# Patient Record
Sex: MALE | Race: BLACK OR AFRICAN AMERICAN | NOT HISPANIC OR LATINO | Employment: FULL TIME | URBAN - METROPOLITAN AREA
[De-identification: names, ages, dates, MRNs, and addresses within clinical notes are randomized per-mention and may not be internally consistent; named-entity substitution may affect disease eponyms.]

---

## 2018-05-07 ENCOUNTER — HOSPITAL ENCOUNTER (EMERGENCY)
Facility: HOSPITAL | Age: 23
Discharge: HOME | End: 2018-05-07
Attending: EMERGENCY MEDICINE
Payer: MEDICAID

## 2018-05-07 VITALS
DIASTOLIC BLOOD PRESSURE: 66 MMHG | HEIGHT: 63 IN | BODY MASS INDEX: 26.58 KG/M2 | TEMPERATURE: 98 F | RESPIRATION RATE: 16 BRPM | SYSTOLIC BLOOD PRESSURE: 121 MMHG | WEIGHT: 150 LBS | OXYGEN SATURATION: 96 % | HEART RATE: 68 BPM

## 2018-05-07 DIAGNOSIS — Z87.09 HISTORY OF ASTHMA: ICD-10-CM

## 2018-05-07 DIAGNOSIS — R06.02 SOB (SHORTNESS OF BREATH): Primary | ICD-10-CM

## 2018-05-07 PROCEDURE — 93005 ELECTROCARDIOGRAM TRACING: CPT

## 2018-05-07 PROCEDURE — 93010 ELECTROCARDIOGRAM REPORT: CPT | Mod: ,,, | Performed by: INTERNAL MEDICINE

## 2018-05-07 PROCEDURE — 99284 EMERGENCY DEPT VISIT MOD MDM: CPT | Mod: 25

## 2018-05-07 PROCEDURE — 25000242 PHARM REV CODE 250 ALT 637 W/ HCPCS: Performed by: NURSE PRACTITIONER

## 2018-05-07 PROCEDURE — 94640 AIRWAY INHALATION TREATMENT: CPT | Mod: 76

## 2018-05-07 PROCEDURE — 94761 N-INVAS EAR/PLS OXIMETRY MLT: CPT

## 2018-05-07 RX ORDER — ALBUTEROL SULFATE 90 UG/1
1-2 AEROSOL, METERED RESPIRATORY (INHALATION) EVERY 6 HOURS PRN
Qty: 1 INHALER | Refills: 0 | Status: ON HOLD | OUTPATIENT
Start: 2018-05-07 | End: 2020-01-22 | Stop reason: SDUPTHER

## 2018-05-07 RX ORDER — IPRATROPIUM BROMIDE AND ALBUTEROL SULFATE 2.5; .5 MG/3ML; MG/3ML
3 SOLUTION RESPIRATORY (INHALATION)
Status: COMPLETED | OUTPATIENT
Start: 2018-05-07 | End: 2018-05-07

## 2018-05-07 RX ORDER — ALBUTEROL SULFATE 0.83 MG/ML
2.5 SOLUTION RESPIRATORY (INHALATION) EVERY 6 HOURS PRN
Qty: 1 BOX | Refills: 0 | Status: SHIPPED | OUTPATIENT
Start: 2018-05-07 | End: 2019-05-07

## 2018-05-07 RX ADMIN — IPRATROPIUM BROMIDE AND ALBUTEROL SULFATE 3 ML: .5; 2.5 SOLUTION RESPIRATORY (INHALATION) at 09:05

## 2018-05-07 NOTE — ED PROVIDER NOTES
Encounter Date: 5/7/2018  SORT: This is a 21 y/o male that comes to the ER c/o SOB. Initial exam notable for clear breath sounds bilaterally, though pt appears to be tight and labored. O2 saturation is 100% on room air.         History     Chief Complaint   Patient presents with    Shortness of Breath     SOB with chest tightness while jogging.  PMx asthma.  O2 sats 100%, labored breathing.     22-year-old male with history of asthma that requires daily steroid presents emergency department for gradual onset of chest pain associated shortness of breath while jogging at approximately 8:30 a.m. this morning.  Patient states shortness of breath was severe enough to bring him to his knees and caused him to vomit once.  Patient states symptoms are similar to his past asthma exacerbations.  Never hospitalized or intubated for his asthma.  Currently asymptomatic after breathing treatment in triage.  No other medications taken prior to arrival.  Acting normal per family.          Review of patient's allergies indicates:   Allergen Reactions    Peanut Anaphylaxis     Past Medical History:   Diagnosis Date    Asthma      History reviewed. No pertinent surgical history.  Family History   Problem Relation Age of Onset    Kidney disease Father     Hypertension Father      Social History   Substance Use Topics    Smoking status: Never Smoker    Smokeless tobacco: Never Used    Alcohol use No     Review of Systems   Constitutional: Negative for fever.   HENT: Negative for sore throat.    Respiratory: Positive for shortness of breath (resolved). Negative for cough.    Cardiovascular: Positive for chest pain (resolved).   Gastrointestinal: Positive for vomiting (x1; resolved). Negative for abdominal pain and nausea.   Genitourinary: Negative for dysuria.   Musculoskeletal: Negative for back pain and neck pain.   Neurological: Negative for headaches.   All other systems reviewed and are negative.      Physical Exam      Initial Vitals [05/07/18 0925]   BP Pulse Resp Temp SpO2   120/64 105 (!) 24 98.5 °F (36.9 °C) 100 %      MAP       82.67         Physical Exam    Nursing note and vitals reviewed.  Constitutional: He appears well-developed and well-nourished. He is not diaphoretic. No distress.   HENT:   Head: Normocephalic and atraumatic.   Nose: Nose normal.   Mouth/Throat: Oropharynx is clear and moist. No oropharyngeal exudate.   Eyes: Conjunctivae and EOM are normal. Right eye exhibits no discharge. Left eye exhibits no discharge.   Neck: Normal range of motion. No tracheal deviation present. No JVD present.   Cardiovascular: Normal rate, regular rhythm and normal heart sounds. Exam reveals no friction rub.    No murmur heard.  Pulmonary/Chest: Breath sounds normal. No accessory muscle usage or stridor. No tachypnea. No respiratory distress. He has no decreased breath sounds. He has no wheezes. He has no rhonchi. He has no rales. He exhibits no tenderness.   Abdominal: Soft. He exhibits no distension. There is no tenderness. There is no rigidity, no rebound, no guarding, no CVA tenderness, no tenderness at McBurney's point and negative Houser's sign.   Musculoskeletal: Normal range of motion.   Neurological: He is alert and oriented to person, place, and time.   Skin: Skin is warm and dry. No rash and no abscess noted. No erythema. No pallor.         ED Course   Procedures  Labs Reviewed - No data to display  EKG Readings: (Independently Interpreted)   Initial Reading: No STEMI. Rhythm: Sinus Tachycardia. Heart Rate: 110. Axis: Normal.       X-Rays:   Independently Interpreted Readings:   Chest X-Ray: No acute abnormalities.     Medical Decision Making:   History:   Old Medical Records: I decided to obtain old medical records.    This is an emergent evaluation of a 22 y.o. male with a PMHx of asthma presenting to the ED for SOB described as similar to his past asthma flares per patient. Denies fever, trauma,and hemoptysis.  , vitals otherwise WNL, afebrile; SpO2 100%.     Duoneb ordered from triage significant improves symptoms per patient; currently asymptomatic. Normal vitals. Normal behavior per family.     EKG shows no arhythmia. CXR shows no PNA, atelectasis, pneumomediastinum, or PTX.    Presentation consistent with acute exercise induced asthma exacerbation in this patient with known Hx of asthma. Given the above, I have also considered but doubt airway foreign body, anaphylaxis, angioedema, epiglottitis, Rk's, PTA, retropharyngeal abscess, strep throat, and PE. I doubt cardiac etiology.    Discharged home with Albuterol inhaler, nebulizer solution. Instructed to follow up with PCP promptly for reevaluation and management of symptoms. Issued educational handout on ways to avoid asthma triggers.     I discussed with the patient the diagnosis, treatment plan, indications for return to the emergency department, and for expected follow-up. The patient verbalized an understanding. The patient is asked if there are any questions or concerns. We discuss the case, until all issues are addressed to the patients satisfaction. Patient understands and is agreeable to the plan.     I discussed this patient with Dr. Post who is in agreement with my assessment and plan.                     Clinical Impression:   The primary encounter diagnosis was SOB (shortness of breath). A diagnosis of History of asthma was also pertinent to this visit.    Disposition:   Disposition: Discharged  Condition: Stable                        Go Oro PA-C  05/07/18 7497

## 2018-05-07 NOTE — ED TRIAGE NOTES
Mom reports son was jogging this AM. C/o CP, SOB, chest tightness, cough,  Vomiting with forceful cough this AM.

## 2018-08-28 ENCOUNTER — HOSPITAL ENCOUNTER (EMERGENCY)
Facility: OTHER | Age: 23
Discharge: HOME | End: 2018-08-28
Attending: EMERGENCY MEDICINE
Payer: MEDICAID

## 2018-08-28 VITALS
TEMPERATURE: 99 F | OXYGEN SATURATION: 100 % | RESPIRATION RATE: 16 BRPM | HEIGHT: 64 IN | WEIGHT: 147 LBS | BODY MASS INDEX: 25.1 KG/M2 | HEART RATE: 109 BPM | DIASTOLIC BLOOD PRESSURE: 69 MMHG | SYSTOLIC BLOOD PRESSURE: 130 MMHG

## 2018-08-28 DIAGNOSIS — J45.901 EXACERBATION OF ASTHMA, UNSPECIFIED ASTHMA SEVERITY, UNSPECIFIED WHETHER PERSISTENT: Primary | ICD-10-CM

## 2018-08-28 PROCEDURE — 25000242 PHARM REV CODE 250 ALT 637 W/ HCPCS: Performed by: EMERGENCY MEDICINE

## 2018-08-28 PROCEDURE — 94640 AIRWAY INHALATION TREATMENT: CPT

## 2018-08-28 PROCEDURE — 63600175 PHARM REV CODE 636 W HCPCS: Performed by: EMERGENCY MEDICINE

## 2018-08-28 PROCEDURE — 94761 N-INVAS EAR/PLS OXIMETRY MLT: CPT

## 2018-08-28 PROCEDURE — 99284 EMERGENCY DEPT VISIT MOD MDM: CPT | Mod: 25

## 2018-08-28 RX ORDER — IPRATROPIUM BROMIDE AND ALBUTEROL SULFATE 2.5; .5 MG/3ML; MG/3ML
3 SOLUTION RESPIRATORY (INHALATION)
Status: COMPLETED | OUTPATIENT
Start: 2018-08-28 | End: 2018-08-28

## 2018-08-28 RX ORDER — PREDNISONE 20 MG/1
40 TABLET ORAL DAILY
Qty: 10 TABLET | Refills: 0 | Status: SHIPPED | OUTPATIENT
Start: 2018-08-28 | End: 2018-09-02

## 2018-08-28 RX ORDER — PREDNISONE 20 MG/1
60 TABLET ORAL
Status: COMPLETED | OUTPATIENT
Start: 2018-08-28 | End: 2018-08-28

## 2018-08-28 RX ORDER — ALBUTEROL SULFATE 0.83 MG/ML
2.5 SOLUTION RESPIRATORY (INHALATION)
Status: ACTIVE | OUTPATIENT
Start: 2018-08-28 | End: 2018-08-28

## 2018-08-28 RX ADMIN — PREDNISONE 60 MG: 20 TABLET ORAL at 02:08

## 2018-08-28 RX ADMIN — IPRATROPIUM BROMIDE AND ALBUTEROL SULFATE 3 ML: .5; 3 SOLUTION RESPIRATORY (INHALATION) at 02:08

## 2018-08-28 NOTE — ED NOTES
Pt to er with c/o sob  X 30 min . Pt given duoneb in route. respiration still labored ,air movement good all lobes.  Skin warm and dry. aaox3 no peripheral edema noted. Talking in full  Short sentences but deep breath each time.

## 2018-08-28 NOTE — PROVIDER PROGRESS NOTES - EMERGENCY DEPT.
Encounter Date: 8/28/2018    ED Physician Progress Notes        Physician Note:   5:47 PM patient called ED to inform us he forgot his prescription for prednisone at the ER. He is requesting that we call in the rx to his pharmacy. [facility] on [address]. I was able to call this prescription in for the patient. Prednisone 40mg daily for 5 days with no refills. EDMD aware. NOELLE BLACKBURN

## 2018-08-28 NOTE — ED NOTES
Pt has duplicate orders for nebulized tx. Duo nebs given and tolerated well. Albuterol not given,asked RN to discontinue.

## 2018-08-28 NOTE — ED PROVIDER NOTES
, but did not have his asthma pump with him at school.  Encounter Date: 8/28/2018    SCRIBE #1 NOTE: I, Raz Rosales, am scribing for, and in the presence of, Dr. Jackson.       History     Chief Complaint   Patient presents with    Shortness of Breath     pt with sob x 30 min . pt given duoneb in route     Time seen by provider: 2:29 PM    This is a 22 y.o. male with a history of asthma and eczema who presents with complaint of shortness of breath that began approximately one hour ago. He reports that he is also experiencing a productive cough with yellow clear mucous. EMS reports that he was given a nebulizer treatment before arrival. The patient reports that he take Breo once a day and has a ProAir. He reports that his morning he took his inhaler twice before starting his day. When the asthma attack started he reports that he didn't use his inhaler, due to him not having it. The last time the patient remembers being put on prednisone was a few months ago. He denies fever, sore throat, chest pain, nausea, and dysuria.       The history is provided by the patient and the EMS personnel.     Review of patient's allergies indicates:   Allergen Reactions    Peanut Anaphylaxis     Past Medical History:   Diagnosis Date    Asthma     Eczema      History reviewed. No pertinent surgical history.  Family History   Problem Relation Age of Onset    Kidney disease Father     Hypertension Father      Social History     Tobacco Use    Smoking status: Never Smoker    Smokeless tobacco: Never Used   Substance Use Topics    Alcohol use: No    Drug use: No     Review of Systems   Constitutional: Negative for fever.   HENT: Negative for sore throat.    Respiratory: Positive for cough (Productive) and shortness of breath.    Cardiovascular: Negative for chest pain.   Gastrointestinal: Negative for nausea.   Genitourinary: Negative for dysuria.   Musculoskeletal: Negative for back pain.   Skin: Negative for rash.    Neurological: Negative for weakness.   Hematological: Does not bruise/bleed easily.       Physical Exam     Initial Vitals [08/28/18 1425]   BP Pulse Resp Temp SpO2   135/73 (!) 135 20 98.1 °F (36.7 °C) 100 %      MAP       --         Physical Exam    Nursing note and vitals reviewed.  Constitutional: He appears well-developed and well-nourished. He is not diaphoretic. No distress.   Thin male. Mild dyspnea with exertion or talking.    HENT:   Head: Normocephalic and atraumatic.   Right Ear: External ear normal.   Left Ear: External ear normal.   Mucous membranes are moist.   Eyes: EOM are normal. Pupils are equal, round, and reactive to light. Right eye exhibits no discharge. Left eye exhibits no discharge.   No pallor or icterus.   Neck: Normal range of motion.   Cardiovascular: Normal rate, regular rhythm and normal heart sounds. Exam reveals no gallop and no friction rub.    No murmur heard.  Pulmonary/Chest: No respiratory distress. He has wheezes. He has no rhonchi. He has no rales.   Faint expiratory wheeze on right. Good air exchange. No accessory muscle use.   Abdominal: Soft. There is no tenderness. There is no rebound and no guarding.   Musculoskeletal: Normal range of motion. He exhibits no edema or tenderness.   Neurological: He is alert and oriented to person, place, and time.   Skin: Skin is warm and dry. No rash and no abscess noted. No erythema. No pallor.   Psychiatric: He has a normal mood and affect. His behavior is normal. Judgment and thought content normal.         ED Course   Procedures  Labs Reviewed - No data to display       Imaging Results    None          Medical Decision Making:   ED Management:  3:49 PM  The patient is feeling better. He reports that her is back to baseline and requesting discharge.            Scribe Attestation:   Scribe #1: I performed the above scribed service and the documentation accurately describes the services I performed. I attest to the accuracy of the  note.    Attending Attestation:           Physician Attestation for Scribe:  Physician Attestation Statement for Scribe #1: I, Dr. Jackson, reviewed documentation, as scribed by Raz Rosales in my presence, and it is both accurate and complete.           Patient presents with shortness of breath, wheezing.  History of asthma , but did not have his albuterol pump with him when the symptoms started.  Given nebulizer treatment by EMS and is improved but still feels tight upon arrival here.  No recent fevers.  On exam he is well-appearing with only faint expiratory wheeze, good air exchange.  Given further nebulizer treatments and now feels back to baseline.  Repeat lung exam at this point is clear to auscultation.  Will be started on a burst of prednisone.  Mom now also at bedside and also updated on findings and plan of care             Clinical Impression:     1. Exacerbation of asthma, unspecified asthma severity, unspecified whether persistent                                 Renan Jackson II, MD  08/30/18 0883

## 2018-08-28 NOTE — ED NOTES
"Assumed care of pt, received BSSR from YVAN Soria. Pt semi fowlers in bed, AAOx4, GCS 15, calm, cooperative, responding appropriately to questions, speaking in full sentences, skin warm and dry, respirations even and unlabored, lungs CTA bilaterally, pt states "I feel a lot better". Visitor at bedside. Bed locked and in lowest position, side rails x 2, call light in reach.  "

## 2019-10-03 ENCOUNTER — HOSPITAL ENCOUNTER (EMERGENCY)
Facility: HOSPITAL | Age: 24
Discharge: HOME | End: 2019-10-03
Attending: EMERGENCY MEDICINE
Payer: MEDICAID

## 2019-10-03 VITALS
HEART RATE: 95 BPM | DIASTOLIC BLOOD PRESSURE: 79 MMHG | BODY MASS INDEX: 28.77 KG/M2 | SYSTOLIC BLOOD PRESSURE: 121 MMHG | WEIGHT: 179 LBS | HEIGHT: 66 IN | OXYGEN SATURATION: 99 % | RESPIRATION RATE: 18 BRPM | TEMPERATURE: 98 F

## 2019-10-03 DIAGNOSIS — F25.9 SCHIZOAFFECTIVE SCHIZOPHRENIA: Primary | ICD-10-CM

## 2019-10-03 LAB
ALBUMIN SERPL BCP-MCNC: 4.5 G/DL (ref 3.5–5.2)
ALP SERPL-CCNC: 90 U/L (ref 55–135)
ALT SERPL W/O P-5'-P-CCNC: 28 U/L (ref 10–44)
AMPHET+METHAMPHET UR QL: NEGATIVE
ANION GAP SERPL CALC-SCNC: 7 MMOL/L (ref 8–16)
APAP SERPL-MCNC: <3 UG/ML (ref 10–20)
AST SERPL-CCNC: 29 U/L (ref 10–40)
BARBITURATES UR QL SCN>200 NG/ML: NEGATIVE
BASOPHILS # BLD AUTO: 0.05 K/UL (ref 0–0.2)
BASOPHILS NFR BLD: 0.6 % (ref 0–1.9)
BENZODIAZ UR QL SCN>200 NG/ML: NEGATIVE
BILIRUB SERPL-MCNC: 0.6 MG/DL (ref 0.1–1)
BILIRUB UR QL STRIP: NEGATIVE
BUN SERPL-MCNC: 9 MG/DL (ref 6–20)
BZE UR QL SCN: NEGATIVE
CALCIUM SERPL-MCNC: 9.6 MG/DL (ref 8.7–10.5)
CANNABINOIDS UR QL SCN: NORMAL
CHLORIDE SERPL-SCNC: 103 MMOL/L (ref 95–110)
CLARITY UR: ABNORMAL
CO2 SERPL-SCNC: 28 MMOL/L (ref 23–29)
COLOR UR: YELLOW
CREAT SERPL-MCNC: 1.2 MG/DL (ref 0.5–1.4)
CREAT UR-MCNC: 265.6 MG/DL (ref 23–375)
DIFFERENTIAL METHOD: ABNORMAL
EOSINOPHIL # BLD AUTO: 0.6 K/UL (ref 0–0.5)
EOSINOPHIL NFR BLD: 7.9 % (ref 0–8)
ERYTHROCYTE [DISTWIDTH] IN BLOOD BY AUTOMATED COUNT: 13.6 % (ref 11.5–14.5)
EST. GFR  (AFRICAN AMERICAN): >60 ML/MIN/1.73 M^2
EST. GFR  (NON AFRICAN AMERICAN): >60 ML/MIN/1.73 M^2
ETHANOL SERPL-MCNC: <10 MG/DL
GLUCOSE SERPL-MCNC: 91 MG/DL (ref 70–110)
GLUCOSE UR QL STRIP: NEGATIVE
HCT VFR BLD AUTO: 43 % (ref 40–54)
HGB BLD-MCNC: 13.8 G/DL (ref 14–18)
HGB UR QL STRIP: NEGATIVE
KETONES UR QL STRIP: NEGATIVE
LEUKOCYTE ESTERASE UR QL STRIP: NEGATIVE
LYMPHOCYTES # BLD AUTO: 2.5 K/UL (ref 1–4.8)
LYMPHOCYTES NFR BLD: 31.4 % (ref 18–48)
MCH RBC QN AUTO: 29.2 PG (ref 27–31)
MCHC RBC AUTO-ENTMCNC: 32.1 G/DL (ref 32–36)
MCV RBC AUTO: 91 FL (ref 82–98)
METHADONE UR QL SCN>300 NG/ML: NEGATIVE
MONOCYTES # BLD AUTO: 0.8 K/UL (ref 0.3–1)
MONOCYTES NFR BLD: 10.6 % (ref 4–15)
NEUTROPHILS # BLD AUTO: 3.9 K/UL (ref 1.8–7.7)
NEUTROPHILS NFR BLD: 49.5 % (ref 38–73)
NITRITE UR QL STRIP: NEGATIVE
OPIATES UR QL SCN: NEGATIVE
PCP UR QL SCN>25 NG/ML: NEGATIVE
PH UR STRIP: >8 [PH] (ref 5–8)
PLATELET # BLD AUTO: 302 K/UL (ref 150–350)
PMV BLD AUTO: 10.2 FL (ref 9.2–12.9)
POTASSIUM SERPL-SCNC: 3.8 MMOL/L (ref 3.5–5.1)
PROT SERPL-MCNC: 7.5 G/DL (ref 6–8.4)
PROT UR QL STRIP: NEGATIVE
RBC # BLD AUTO: 4.73 M/UL (ref 4.6–6.2)
SODIUM SERPL-SCNC: 138 MMOL/L (ref 136–145)
SP GR UR STRIP: 1.02 (ref 1–1.03)
TOXICOLOGY INFORMATION: NORMAL
TSH SERPL DL<=0.005 MIU/L-ACNC: 1.4 UIU/ML (ref 0.4–4)
URN SPEC COLLECT METH UR: ABNORMAL
UROBILINOGEN UR STRIP-ACNC: NEGATIVE EU/DL
WBC # BLD AUTO: 7.84 K/UL (ref 3.9–12.7)

## 2019-10-03 PROCEDURE — 80307 DRUG TEST PRSMV CHEM ANLYZR: CPT

## 2019-10-03 PROCEDURE — 85025 COMPLETE CBC W/AUTO DIFF WBC: CPT

## 2019-10-03 PROCEDURE — 80320 DRUG SCREEN QUANTALCOHOLS: CPT

## 2019-10-03 PROCEDURE — 80329 ANALGESICS NON-OPIOID 1 OR 2: CPT

## 2019-10-03 PROCEDURE — 81003 URINALYSIS AUTO W/O SCOPE: CPT

## 2019-10-03 PROCEDURE — 80053 COMPREHEN METABOLIC PANEL: CPT

## 2019-10-03 PROCEDURE — 99285 EMERGENCY DEPT VISIT HI MDM: CPT

## 2019-10-03 PROCEDURE — 84443 ASSAY THYROID STIM HORMONE: CPT

## 2019-10-03 NOTE — DISCHARGE INSTRUCTIONS
Please take her psychiatric medicines exactly as directed.  Please follow-up at the [facility] this week.  Return immediately if he gets worse or if new problems develop.

## 2019-10-03 NOTE — ED TRIAGE NOTES
Patient is schizophrenic and has not been taking his meds  States he went to his new psychiatrist office for his monthly injection but has not seen his new counselor yet  Patient reports general ideas of harming himself, insomnia with visual and auditory hallucinations  Patient requests to go to [facility] when placed

## 2019-10-03 NOTE — ED PROVIDER NOTES
"Encounter Date: 10/3/2019    SCRIBE #1 NOTE: I, Go Rodriguez, am scribing for, and in the presence of,  Carl Post MD. I have scribed the following portions of the note - Other sections scribed: HPI, ROS.       History     Chief Complaint   Patient presents with    Suicidal     SI for the last week states "I would cut myself" hx of depression denies any recent changes to medication.      CC: Suicidal    HPI: This 23 y.o. Male with schizoaffective disorder, schizophrenia bipolar type, asthma and eczema presents to the ED for an evaluation of suidical ideation for the past week. Pt admits he attempted to cut himself. He is hearing voices and seeing things that are not there. Pt has been off his psych medications for months which include a mood stabilizer. He normally complies with Invega shot once a month. Pt reports having a conversation with Ms. Ellis from [facility] and he needs to submit a form to go to [facility]. He wants to be admitted to [facility]. Pt denies fever, rhinorrhea, cough, weakness, numbness or any pain.    The history is provided by the patient. No  was used.     Review of patient's allergies indicates:   Allergen Reactions    Peanut Anaphylaxis     Past Medical History:   Diagnosis Date    Asthma     Eczema     Schizo affective schizophrenia      History reviewed. No pertinent surgical history.  Family History   Problem Relation Age of Onset    Kidney disease Father     Hypertension Father      Social History     Tobacco Use    Smoking status: Never Smoker    Smokeless tobacco: Never Used   Substance Use Topics    Alcohol use: No    Drug use: No     Review of Systems   Constitutional: Negative for chills and fever.   HENT: Negative for congestion, rhinorrhea and sore throat.    Eyes: Negative for pain.   Respiratory: Negative for shortness of breath.    Cardiovascular: Negative for chest pain.   Gastrointestinal: Negative for abdominal pain, diarrhea, " nausea and vomiting.   Genitourinary: Negative for dysuria and hematuria.   Musculoskeletal: Negative for back pain and neck pain.   Skin: Negative for rash.   Neurological: Negative for dizziness, syncope and light-headedness.   Psychiatric/Behavioral: Positive for suicidal ideas. The patient is not nervous/anxious.        Physical Exam     Initial Vitals [10/03/19 1250]   BP Pulse Resp Temp SpO2   121/79 95 18 98.1 °F (36.7 °C) 99 %      MAP       --         Physical Exam  The patient was examined specifically for the following:   General:No significant distress, Good color, Warm and dry. Head and neck:Scalp atraumatic, Neck supple. Neurological:Appropriate conversation, Gross motor deficits. Eyes:Conjugate gaze, Clear corneas. ENT: No epistaxis. Cardiac: Regular rate and rhythm, Grossly normal heart tones. Pulmonary: Wheezing, Rales. Gastrointestinal: Abdominal tenderness, Abdominal distention. Musculoskeletal: Extremity deformity, Apparent pain with range of motion of the joints. Skin: Rash.   The findings on examination were normal except for the following:  The patient reports that he is hearing voices, seeing things, and thinking about hurting himself.  ED Course   Procedures  Labs Reviewed   CBC W/ AUTO DIFFERENTIAL - Abnormal; Notable for the following components:       Result Value    Hemoglobin 13.8 (*)     Eos # 0.6 (*)     All other components within normal limits   COMPREHENSIVE METABOLIC PANEL - Abnormal; Notable for the following components:    Anion Gap 7 (*)     All other components within normal limits   URINALYSIS, REFLEX TO URINE CULTURE - Abnormal; Notable for the following components:    Appearance, UA Hazy (*)     pH, UA >8.0 (*)     All other components within normal limits    Narrative:     Preferred Collection Type->Urine, Clean Catch   ACETAMINOPHEN LEVEL - Abnormal; Notable for the following components:    Acetaminophen (Tylenol), Serum <3.0 (*)     All other components within normal  limits   TSH   DRUG SCREEN PANEL, URINE EMERGENCY    Narrative:     Preferred Collection Type->Urine, Clean Catch   ALCOHOL,MEDICAL (ETHANOL)          Imaging Results    None       Medical decision making:  Given the above, PEC form was completed.  I will place this patient Psychiatry.  He is medically clear for psychiatric admission.  The patient is suicidal and hearing voices.  He claims noncompliance with his medicines.    This patient's mother arrived to the emergency room to report that she was not aware that he was not taking his medicines.  She will be sure that he does take his medicines.  The patient reported that he is not suicidal at this time.  And he is not hearing voices.  The patient seems very calm and relaxed.  The mother and the patient heard comfortable with discharge. I will execute that disposition.                Scribe Attestation:   Scribe #1: I performed the above scribed service and the documentation accurately describes the services I performed. I attest to the accuracy of the note.      I personally performed the services described in this documentation.  All medical record  entries made by the scribe are at my direction and in my presence.  Signed, Dr. Post        Clinical Impression:       ICD-10-CM ICD-9-CM   1. Schizoaffective schizophrenia F25.0 295.70                                Carl Post MD  10/03/19 1538       Carl Post MD  10/03/19 6872

## 2020-01-15 ENCOUNTER — HOSPITAL ENCOUNTER (EMERGENCY)
Facility: HOSPITAL | Age: 25
Discharge: PSYCHIATRIC OR SUBSTANCE USE FACILITY | End: 2020-01-15
Attending: EMERGENCY MEDICINE
Payer: MEDICAID

## 2020-01-15 VITALS
HEIGHT: 63 IN | SYSTOLIC BLOOD PRESSURE: 119 MMHG | RESPIRATION RATE: 16 BRPM | DIASTOLIC BLOOD PRESSURE: 57 MMHG | OXYGEN SATURATION: 99 % | BODY MASS INDEX: 31.89 KG/M2 | HEART RATE: 88 BPM | WEIGHT: 180 LBS | TEMPERATURE: 98 F

## 2020-01-15 DIAGNOSIS — R45.851 SUICIDAL IDEATION: Primary | ICD-10-CM

## 2020-01-15 PROBLEM — F25.0 SCHIZOAFFECTIVE DISORDER, BIPOLAR TYPE: Status: ACTIVE | Noted: 2020-01-15

## 2020-01-15 LAB
ALBUMIN SERPL BCP-MCNC: 4.5 G/DL (ref 3.5–5.2)
ALP SERPL-CCNC: 96 U/L (ref 55–135)
ALT SERPL W/O P-5'-P-CCNC: 29 U/L (ref 10–44)
AMPHET+METHAMPHET UR QL: NEGATIVE
ANION GAP SERPL CALC-SCNC: 8 MMOL/L (ref 8–16)
APAP SERPL-MCNC: <3 UG/ML (ref 10–20)
AST SERPL-CCNC: 33 U/L (ref 10–40)
BARBITURATES UR QL SCN>200 NG/ML: NEGATIVE
BASOPHILS # BLD AUTO: 0.05 K/UL (ref 0–0.2)
BASOPHILS NFR BLD: 0.6 % (ref 0–1.9)
BENZODIAZ UR QL SCN>200 NG/ML: NEGATIVE
BILIRUB SERPL-MCNC: 0.4 MG/DL (ref 0.1–1)
BILIRUB UR QL STRIP: NEGATIVE
BUN SERPL-MCNC: 12 MG/DL (ref 6–20)
BZE UR QL SCN: NEGATIVE
CALCIUM SERPL-MCNC: 9.7 MG/DL (ref 8.7–10.5)
CANNABINOIDS UR QL SCN: NORMAL
CHLORIDE SERPL-SCNC: 106 MMOL/L (ref 95–110)
CLARITY UR: CLEAR
CO2 SERPL-SCNC: 24 MMOL/L (ref 23–29)
COLOR UR: YELLOW
CREAT SERPL-MCNC: 1 MG/DL (ref 0.5–1.4)
CREAT UR-MCNC: 167.9 MG/DL (ref 23–375)
DIFFERENTIAL METHOD: NORMAL
EOSINOPHIL # BLD AUTO: 0.4 K/UL (ref 0–0.5)
EOSINOPHIL NFR BLD: 4.8 % (ref 0–8)
ERYTHROCYTE [DISTWIDTH] IN BLOOD BY AUTOMATED COUNT: 13 % (ref 11.5–14.5)
EST. GFR  (AFRICAN AMERICAN): >60 ML/MIN/1.73 M^2
EST. GFR  (NON AFRICAN AMERICAN): >60 ML/MIN/1.73 M^2
ETHANOL SERPL-MCNC: <10 MG/DL
GLUCOSE SERPL-MCNC: 99 MG/DL (ref 70–110)
GLUCOSE UR QL STRIP: NEGATIVE
HCT VFR BLD AUTO: 44.8 % (ref 40–54)
HGB BLD-MCNC: 14.6 G/DL (ref 14–18)
HGB UR QL STRIP: NEGATIVE
IMM GRANULOCYTES # BLD AUTO: 0.04 K/UL (ref 0–0.04)
IMM GRANULOCYTES NFR BLD AUTO: 0.4 % (ref 0–0.5)
KETONES UR QL STRIP: NEGATIVE
LEUKOCYTE ESTERASE UR QL STRIP: NEGATIVE
LYMPHOCYTES # BLD AUTO: 3.3 K/UL (ref 1–4.8)
LYMPHOCYTES NFR BLD: 37.1 % (ref 18–48)
MCH RBC QN AUTO: 29.3 PG (ref 27–31)
MCHC RBC AUTO-ENTMCNC: 32.6 G/DL (ref 32–36)
MCV RBC AUTO: 90 FL (ref 82–98)
METHADONE UR QL SCN>300 NG/ML: NEGATIVE
MONOCYTES # BLD AUTO: 0.7 K/UL (ref 0.3–1)
MONOCYTES NFR BLD: 7.6 % (ref 4–15)
NEUTROPHILS # BLD AUTO: 4.4 K/UL (ref 1.8–7.7)
NEUTROPHILS NFR BLD: 49.5 % (ref 38–73)
NITRITE UR QL STRIP: NEGATIVE
NRBC BLD-RTO: 0 /100 WBC
OPIATES UR QL SCN: NEGATIVE
PCP UR QL SCN>25 NG/ML: NEGATIVE
PH UR STRIP: 7 [PH] (ref 5–8)
PLATELET # BLD AUTO: 320 K/UL (ref 150–350)
PMV BLD AUTO: 9.9 FL (ref 9.2–12.9)
POTASSIUM SERPL-SCNC: 3.8 MMOL/L (ref 3.5–5.1)
PROT SERPL-MCNC: 7.9 G/DL (ref 6–8.4)
PROT UR QL STRIP: NEGATIVE
RBC # BLD AUTO: 4.98 M/UL (ref 4.6–6.2)
SODIUM SERPL-SCNC: 138 MMOL/L (ref 136–145)
SP GR UR STRIP: 1.02 (ref 1–1.03)
TOXICOLOGY INFORMATION: NORMAL
TSH SERPL DL<=0.005 MIU/L-ACNC: 0.97 UIU/ML (ref 0.4–4)
URN SPEC COLLECT METH UR: NORMAL
UROBILINOGEN UR STRIP-ACNC: NEGATIVE EU/DL
WBC # BLD AUTO: 8.91 K/UL (ref 3.9–12.7)

## 2020-01-15 PROCEDURE — 85025 COMPLETE CBC W/AUTO DIFF WBC: CPT

## 2020-01-15 PROCEDURE — 80053 COMPREHEN METABOLIC PANEL: CPT

## 2020-01-15 PROCEDURE — 80307 DRUG TEST PRSMV CHEM ANLYZR: CPT

## 2020-01-15 PROCEDURE — 81003 URINALYSIS AUTO W/O SCOPE: CPT | Mod: 59

## 2020-01-15 PROCEDURE — 80329 ANALGESICS NON-OPIOID 1 OR 2: CPT

## 2020-01-15 PROCEDURE — 99285 EMERGENCY DEPT VISIT HI MDM: CPT

## 2020-01-15 PROCEDURE — 80320 DRUG SCREEN QUANTALCOHOLS: CPT

## 2020-01-15 PROCEDURE — 84443 ASSAY THYROID STIM HORMONE: CPT

## 2020-01-15 RX ORDER — ESCITALOPRAM OXALATE 10 MG/1
10 TABLET ORAL DAILY
Status: ON HOLD | COMMUNITY
End: 2020-01-22 | Stop reason: HOSPADM

## 2020-01-15 NOTE — ED PROVIDER NOTES
"Encounter Date: 1/15/2020    SCRIBE #1 NOTE: I, Heidy Vivas, am scribing for, and in the presence of,  Phil Ortega MD. I have scribed the following portions of the note - Other sections scribed: HPI, ROS, PE, Initial Assessment.       History     Chief Complaint   Patient presents with    Suicidal     Pt reports being suicidal x 2 weeks. Pt states, "i started hearing and seeing things and i started crying and cutting myself." Pt calm and cooperative.      24 year old male patient with a past medical history of Bipolar Schizoaffective Disorder presents to the ED complaining of self-injury via cutting his left forearm this morning. The patient also complains of suicidal ideation and dysphoric mood for the past 2 weeks. He additionally reports auditory hallucinations. He reports that his medications have not been working. He reports tobacco and marijuana use.    The history is provided by the patient.     Review of patient's allergies indicates:   Allergen Reactions    Peanut Anaphylaxis     Past Medical History:   Diagnosis Date    Asthma     Eczema     Schizo affective schizophrenia      Past Surgical History:   Procedure Laterality Date    colon surgery       Family History   Problem Relation Age of Onset    Kidney disease Father     Hypertension Father      Social History     Tobacco Use    Smoking status: Current Every Day Smoker     Packs/day: 0.50     Types: Cigarettes    Smokeless tobacco: Never Used   Substance Use Topics    Alcohol use: Yes    Drug use: Yes     Types: Marijuana     Review of Systems   Constitutional: Negative.    HENT: Negative.    Eyes: Negative.    Respiratory: Negative.    Cardiovascular: Negative.    Gastrointestinal: Negative.    Genitourinary: Negative.    Musculoskeletal: Negative.    Skin: Positive for wound (Cuts to left forearm).   Neurological: Negative.    Psychiatric/Behavioral: Positive for dysphoric mood, hallucinations (Auditory), self-injury (Cutting) and " suicidal ideas.       Physical Exam     Initial Vitals [01/15/20 0838]   BP Pulse Resp Temp SpO2   129/72 91 12 97.8 °F (36.6 °C) 97 %      MAP       --         Physical Exam    Nursing note and vitals reviewed.  Constitutional: He appears well-developed and well-nourished. He is not diaphoretic. No distress.   HENT:   Head: Normocephalic and atraumatic.   Eyes: Conjunctivae and EOM are normal. No scleral icterus.   Neck: Normal range of motion. Neck supple.   Cardiovascular: Normal heart sounds and intact distal pulses.   Pulmonary/Chest: Breath sounds normal. No stridor. No respiratory distress.   Abdominal: Soft. He exhibits no distension.   Musculoskeletal: Normal range of motion. He exhibits no edema or tenderness.   Neurological: He is alert and oriented to person, place, and time.   Skin: Skin is warm and dry. Abrasion (Superficial abrasions to left forearm) noted. No rash noted.         ED Course   Procedures  Labs Reviewed   ACETAMINOPHEN LEVEL - Abnormal; Notable for the following components:       Result Value    Acetaminophen (Tylenol), Serum <3.0 (*)     All other components within normal limits   COMPREHENSIVE METABOLIC PANEL   TSH   CBC W/ AUTO DIFFERENTIAL   URINALYSIS, REFLEX TO URINE CULTURE    Narrative:     Preferred Collection Type->Urine, Clean Catch   DRUG SCREEN PANEL, URINE EMERGENCY   ALCOHOL,MEDICAL (ETHANOL)          Imaging Results    None          Medical Decision Making:   History:   Old Medical Records: I decided to obtain old medical records.  Initial Assessment:   24 year old male patient with a past medical history of Bipolar Schizoaffective Disorder presents to the ED complaining of self-injury via cutting his left forearm this morning. I will order lab work and place a PEC.  Clinical Tests:   Lab Tests: Ordered and Reviewed            Scribe Attestation:   Scribe #1: I performed the above scribed service and the documentation accurately describes the services I performed. I  attest to the accuracy of the note.               Patient underwent a work up in the emergency department including labs and exam, no acute organic cause of the patient's current psychiatric illness has been identified at this time. Patient medically cleared for psychiatric evaluation.              Clinical Impression:       ICD-10-CM ICD-9-CM   1. Suicidal ideation R45.851 V62.84            I, Phil Ortega M.D., personally performed the services described in this documentation. All medical record entries made by the scribe were at my direction and in my presence. I have reviewed the chart and agree that the record reflects my personal performance and is accurate and complete.                 Phil Ortega MD  01/15/20 2050

## 2020-01-15 NOTE — ED TRIAGE NOTES
"Pt arrived to Ed with c/o suicidal idealtions x 2 weeks. Pt reports, "I started hearing voices and seeing things and started crying and cutting myself." Hx bipolar schizoaffective. Pt reports being non compliant with medications. Pt calm and cooperative. NAD.   "

## 2020-01-16 PROBLEM — S41.112A ARM LACERATION, LEFT, INITIAL ENCOUNTER: Status: ACTIVE | Noted: 2020-01-16

## 2020-01-16 PROBLEM — J45.20 MILD INTERMITTENT ASTHMA WITHOUT COMPLICATION: Status: ACTIVE | Noted: 2020-01-16

## 2020-01-16 PROBLEM — R03.0 ELEVATED BP WITHOUT DIAGNOSIS OF HYPERTENSION: Status: ACTIVE | Noted: 2020-01-16

## 2020-01-16 PROBLEM — J30.2 SEASONAL ALLERGIC RHINITIS: Status: ACTIVE | Noted: 2020-01-16

## 2020-06-05 ENCOUNTER — HOSPITAL ENCOUNTER (EMERGENCY)
Facility: HOSPITAL | Age: 25
Discharge: HOME | End: 2020-06-05
Attending: EMERGENCY MEDICINE
Payer: MEDICAID

## 2020-06-05 VITALS
TEMPERATURE: 98 F | HEIGHT: 65 IN | WEIGHT: 180 LBS | BODY MASS INDEX: 29.99 KG/M2 | RESPIRATION RATE: 17 BRPM | SYSTOLIC BLOOD PRESSURE: 124 MMHG | DIASTOLIC BLOOD PRESSURE: 75 MMHG | OXYGEN SATURATION: 98 % | HEART RATE: 87 BPM

## 2020-06-05 DIAGNOSIS — K60.2 ANAL FISSURE: Primary | ICD-10-CM

## 2020-06-05 PROCEDURE — 25000003 PHARM REV CODE 250: Performed by: NURSE PRACTITIONER

## 2020-06-05 PROCEDURE — 99283 EMERGENCY DEPT VISIT LOW MDM: CPT

## 2020-06-05 RX ORDER — HYDROCORTISONE ACETATE PRAMOXINE HCL 2.5; 1 G/100G; G/100G
CREAM TOPICAL 3 TIMES DAILY
Qty: 30 G | Refills: 0 | Status: SHIPPED | OUTPATIENT
Start: 2020-06-05

## 2020-06-05 RX ADMIN — HYDROCORTISONE ACETATE AND PRAMOXINE HYDROCHLORIDE: 10; 10 CREAM TOPICAL at 05:06

## 2020-06-05 NOTE — DISCHARGE INSTRUCTIONS
Warm sitz baths. Stool softener over the counter.  High fiber diet.  Plenty of water.  Cream as prescribed.  Return to the Emergency department for any worsening or failure to improve, otherwise follow up with your primary care provider.

## 2020-06-05 NOTE — ED TRIAGE NOTES
Pt. Presents to the ED with c/o of burgundy colored blood in stool and toilet bowl that was noticed today. Pt. Reports 7/10 pain that is achey. Denies taking meds PTA. NAD noted.

## 2020-06-06 NOTE — ED PROVIDER NOTES
Encounter Date: 6/5/2020       History     Chief Complaint   Patient presents with    Rectal Bleeding     pt. reports he had a BM at work and it was hard. pt. reports he noted dark blood in his stool when he cleaned himself. pt. denies any active bleeding at the moment.      HPI   Patient is a 24-year-old male who states that earlier today he had a large hard bowel movement and when he wiped there was blood present on the toilet paper and in the bowl but not on the stool.  The stool was brown and the blood was dark red.  He denies a history of colon cancer, ulcerative colitis or Crohn's disease and is had a colonoscopy with a polypectomy previously.  He states that pain which was throbbing persisted long after the bowel movement and is still present now.    Review of patient's allergies indicates:   Allergen Reactions    Peanut Anaphylaxis     Past Medical History:   Diagnosis Date    Asthma     Eczema     Schizo affective schizophrenia      Past Surgical History:   Procedure Laterality Date    colon surgery       Family History   Problem Relation Age of Onset    Kidney disease Father     Hypertension Father      Social History     Tobacco Use    Smoking status: Current Every Day Smoker     Packs/day: 0.50     Types: Cigarettes    Smokeless tobacco: Never Used   Substance Use Topics    Alcohol use: Yes    Drug use: Yes     Types: Marijuana     Review of Systems   Constitutional: Negative for appetite change, chills, diaphoresis, fatigue and fever.   HENT: Negative for congestion, ear discharge, ear pain, postnasal drip, rhinorrhea, sinus pressure, sneezing, sore throat and voice change.    Eyes: Negative for discharge, itching and visual disturbance.   Respiratory: Negative for cough, shortness of breath and wheezing.    Cardiovascular: Negative for chest pain, palpitations and leg swelling.   Gastrointestinal: Positive for blood in stool and rectal pain. Negative for abdominal pain, nausea and vomiting.    Endocrine: Negative for polydipsia, polyphagia and polyuria.   Genitourinary: Negative for difficulty urinating, discharge, dysuria, frequency, hematuria, penile pain, penile swelling and urgency.   Musculoskeletal: Negative for arthralgias and myalgias.   Skin: Negative for rash and wound.   Neurological: Negative for dizziness, seizures, syncope and weakness.   Hematological: Negative for adenopathy. Does not bruise/bleed easily.   Psychiatric/Behavioral: Negative for agitation and self-injury. The patient is not nervous/anxious.        Physical Exam     Initial Vitals [06/05/20 1609]   BP Pulse Resp Temp SpO2   128/67 92 20 98.1 °F (36.7 °C) 98 %      MAP       --         Physical Exam    Nursing note and vitals reviewed.  Constitutional: He appears well-developed and well-nourished. He is not diaphoretic. No distress.   HENT:   Head: Normocephalic and atraumatic.   Right Ear: External ear normal.   Left Ear: External ear normal.   Nose: Nose normal.   Eyes: Pupils are equal, round, and reactive to light. Right eye exhibits no discharge. Left eye exhibits no discharge. No scleral icterus.   Neck: Normal range of motion.   Pulmonary/Chest: No respiratory distress.   Abdominal: He exhibits no distension.   Genitourinary: Rectal exam shows fissure. Rectal exam shows no external hemorrhoid, no internal hemorrhoid, no mass, no tenderness, anal tone normal and guaiac negative stool. Guaiac negative stool. : Acceptable.  Genitourinary Comments: [place] skin tag noted.   Musculoskeletal: Normal range of motion.   Neurological: He is alert and oriented to person, place, and time.   Skin: Skin is dry. Capillary refill takes less than 2 seconds.         ED Course   Procedures  Labs Reviewed - No data to display       Imaging Results    None                APC / Resident Notes:   24-year-old male with a an anterior anal fissure.  I prescribed Analpram cream and follow-up:  Colon Rectal surgery, warm Sitz  baths, stool softeners.  See above for analyses of radiology, labs, and events during pt's visit and direct actions taken. Symptomatic therapies and return precautions on AVS.   Medication choices were made after reviewing allergies, medications, history, available laboratories. See below for discharge prescriptions if any and disposition.                ED Course as of Jun 05 1904 Fri Jun 05, 2020 1708 Rectal exam chaperoned YVAN Herman.    [VC]   1709 BP: 128/67 [VC]   1709 Temp: 98.1 °F (36.7 °C) [VC]   1709 Temp src: Oral [VC]   1709 Pulse: 92 [VC]   1709 Resp: 20 [VC]   1709 SpO2: 98 % [VC]      ED Course User Index  [VC] Pradeep Majano DNP                Clinical Impression:       ICD-10-CM ICD-9-CM   1. Anal fissure K60.2 565.0         Disposition:   Disposition: Discharged  Condition: Stable     ED Disposition Condition    Discharge Stable        ED Prescriptions     Medication Sig Dispense Start Date End Date Auth. Provider    hydrocortisone-pramoxine (ANALPRAM-HC) 2.5-1 % [place] rectally 3 (three) times daily. 30 g 6/5/2020  Pradeep Majano DNP        Follow-up Information     Follow up With Specialties Details Why Contact Info    [facility] COLON & RECTAL SURGERY Colon and Rectal Surgery Schedule an appointment as soon as possible for a visit   [address] [place] [address]  [phone]                                     Pradeep Majano DNP  06/05/20 1904

## 2020-08-02 ENCOUNTER — HOSPITAL ENCOUNTER (EMERGENCY)
Facility: HOSPITAL | Age: 25
Discharge: HOME | End: 2020-08-02
Attending: EMERGENCY MEDICINE
Payer: MEDICAID

## 2020-08-02 VITALS
WEIGHT: 180 LBS | OXYGEN SATURATION: 99 % | HEIGHT: 66 IN | BODY MASS INDEX: 28.93 KG/M2 | DIASTOLIC BLOOD PRESSURE: 76 MMHG | TEMPERATURE: 100 F | SYSTOLIC BLOOD PRESSURE: 136 MMHG | HEART RATE: 82 BPM | RESPIRATION RATE: 18 BRPM

## 2020-08-02 DIAGNOSIS — R51.9 NONINTRACTABLE HEADACHE, UNSPECIFIED CHRONICITY PATTERN, UNSPECIFIED HEADACHE TYPE: ICD-10-CM

## 2020-08-02 DIAGNOSIS — R50.9 FEBRILE ILLNESS: Primary | ICD-10-CM

## 2020-08-02 DIAGNOSIS — R19.7 DIARRHEA, UNSPECIFIED TYPE: ICD-10-CM

## 2020-08-02 LAB
ALBUMIN SERPL BCP-MCNC: 4.5 G/DL (ref 3.5–5.2)
ALP SERPL-CCNC: 110 U/L (ref 55–135)
ALT SERPL W/O P-5'-P-CCNC: 29 U/L (ref 10–44)
ANION GAP SERPL CALC-SCNC: 9 MMOL/L (ref 8–16)
AST SERPL-CCNC: 33 U/L (ref 10–40)
BASOPHILS # BLD AUTO: 0.06 K/UL (ref 0–0.2)
BASOPHILS NFR BLD: 0.4 % (ref 0–1.9)
BILIRUB SERPL-MCNC: 0.3 MG/DL (ref 0.1–1)
BILIRUB UR QL STRIP: NEGATIVE
BUN SERPL-MCNC: 10 MG/DL (ref 6–20)
CALCIUM SERPL-MCNC: 9.3 MG/DL (ref 8.7–10.5)
CHLORIDE SERPL-SCNC: 101 MMOL/L (ref 95–110)
CLARITY UR: CLEAR
CO2 SERPL-SCNC: 24 MMOL/L (ref 23–29)
COLOR UR: YELLOW
CREAT SERPL-MCNC: 1 MG/DL (ref 0.5–1.4)
DIFFERENTIAL METHOD: ABNORMAL
EOSINOPHIL # BLD AUTO: 0.3 K/UL (ref 0–0.5)
EOSINOPHIL NFR BLD: 1.9 % (ref 0–8)
ERYTHROCYTE [DISTWIDTH] IN BLOOD BY AUTOMATED COUNT: 13.3 % (ref 11.5–14.5)
EST. GFR  (AFRICAN AMERICAN): >60 ML/MIN/1.73 M^2
EST. GFR  (NON AFRICAN AMERICAN): >60 ML/MIN/1.73 M^2
GLUCOSE SERPL-MCNC: 97 MG/DL (ref 70–110)
GLUCOSE UR QL STRIP: NEGATIVE
HCT VFR BLD AUTO: 42.7 % (ref 40–54)
HGB BLD-MCNC: 14.2 G/DL (ref 14–18)
HGB UR QL STRIP: ABNORMAL
IMM GRANULOCYTES # BLD AUTO: 0.09 K/UL (ref 0–0.04)
IMM GRANULOCYTES NFR BLD AUTO: 0.5 % (ref 0–0.5)
KETONES UR QL STRIP: NEGATIVE
LACTATE SERPL-SCNC: 0.9 MMOL/L (ref 0.5–2.2)
LEUKOCYTE ESTERASE UR QL STRIP: NEGATIVE
LIPASE SERPL-CCNC: 20 U/L (ref 4–60)
LYMPHOCYTES # BLD AUTO: 2.1 K/UL (ref 1–4.8)
LYMPHOCYTES NFR BLD: 12.3 % (ref 18–48)
MCH RBC QN AUTO: 29.7 PG (ref 27–31)
MCHC RBC AUTO-ENTMCNC: 33.3 G/DL (ref 32–36)
MCV RBC AUTO: 89 FL (ref 82–98)
MICROSCOPIC COMMENT: ABNORMAL
MONOCYTES # BLD AUTO: 1.9 K/UL (ref 0.3–1)
MONOCYTES NFR BLD: 11.3 % (ref 4–15)
NEUTROPHILS # BLD AUTO: 12.5 K/UL (ref 1.8–7.7)
NEUTROPHILS NFR BLD: 73.6 % (ref 38–73)
NITRITE UR QL STRIP: NEGATIVE
NRBC BLD-RTO: 0 /100 WBC
PH UR STRIP: 6 [PH] (ref 5–8)
PLATELET # BLD AUTO: 303 K/UL (ref 150–350)
PMV BLD AUTO: 10 FL (ref 9.2–12.9)
POTASSIUM SERPL-SCNC: 3.7 MMOL/L (ref 3.5–5.1)
PROT SERPL-MCNC: 8.2 G/DL (ref 6–8.4)
PROT UR QL STRIP: NEGATIVE
RBC # BLD AUTO: 4.78 M/UL (ref 4.6–6.2)
RBC #/AREA URNS HPF: 9 /HPF (ref 0–4)
SODIUM SERPL-SCNC: 134 MMOL/L (ref 136–145)
SP GR UR STRIP: 1.03 (ref 1–1.03)
URN SPEC COLLECT METH UR: ABNORMAL
UROBILINOGEN UR STRIP-ACNC: NEGATIVE EU/DL
WBC # BLD AUTO: 16.95 K/UL (ref 3.9–12.7)
WBC #/AREA URNS HPF: 1 /HPF (ref 0–5)

## 2020-08-02 PROCEDURE — 93010 EKG 12-LEAD: ICD-10-PCS | Mod: ,,, | Performed by: INTERNAL MEDICINE

## 2020-08-02 PROCEDURE — 96374 THER/PROPH/DIAG INJ IV PUSH: CPT

## 2020-08-02 PROCEDURE — 93005 ELECTROCARDIOGRAM TRACING: CPT

## 2020-08-02 PROCEDURE — 83605 ASSAY OF LACTIC ACID: CPT

## 2020-08-02 PROCEDURE — 96361 HYDRATE IV INFUSION ADD-ON: CPT

## 2020-08-02 PROCEDURE — 99291 CRITICAL CARE FIRST HOUR: CPT | Mod: 25

## 2020-08-02 PROCEDURE — 83690 ASSAY OF LIPASE: CPT

## 2020-08-02 PROCEDURE — C9113 INJ PANTOPRAZOLE SODIUM, VIA: HCPCS | Performed by: NURSE PRACTITIONER

## 2020-08-02 PROCEDURE — 63600175 PHARM REV CODE 636 W HCPCS: Performed by: NURSE PRACTITIONER

## 2020-08-02 PROCEDURE — U0003 INFECTIOUS AGENT DETECTION BY NUCLEIC ACID (DNA OR RNA); SEVERE ACUTE RESPIRATORY SYNDROME CORONAVIRUS 2 (SARS-COV-2) (CORONAVIRUS DISEASE [COVID-19]), AMPLIFIED PROBE TECHNIQUE, MAKING USE OF HIGH THROUGHPUT TECHNOLOGIES AS DESCRIBED BY CMS-2020-01-R: HCPCS

## 2020-08-02 PROCEDURE — 96372 THER/PROPH/DIAG INJ SC/IM: CPT | Mod: 59

## 2020-08-02 PROCEDURE — 87040 BLOOD CULTURE FOR BACTERIA: CPT

## 2020-08-02 PROCEDURE — 81000 URINALYSIS NONAUTO W/SCOPE: CPT

## 2020-08-02 PROCEDURE — 80053 COMPREHEN METABOLIC PANEL: CPT

## 2020-08-02 PROCEDURE — 85025 COMPLETE CBC W/AUTO DIFF WBC: CPT

## 2020-08-02 PROCEDURE — 25000003 PHARM REV CODE 250: Performed by: NURSE PRACTITIONER

## 2020-08-02 PROCEDURE — 93010 ELECTROCARDIOGRAM REPORT: CPT | Mod: ,,, | Performed by: INTERNAL MEDICINE

## 2020-08-02 PROCEDURE — 96375 TX/PRO/DX INJ NEW DRUG ADDON: CPT

## 2020-08-02 RX ORDER — ESCITALOPRAM OXALATE 10 MG/1
10 TABLET ORAL
COMMUNITY
Start: 2018-11-16

## 2020-08-02 RX ORDER — PALIPERIDONE 9 MG/1
TABLET, EXTENDED RELEASE ORAL
COMMUNITY
Start: 2019-03-08

## 2020-08-02 RX ORDER — ACETAMINOPHEN 500 MG
1000 TABLET ORAL
Status: COMPLETED | OUTPATIENT
Start: 2020-08-02 | End: 2020-08-02

## 2020-08-02 RX ORDER — ONDANSETRON 2 MG/ML
4 INJECTION INTRAMUSCULAR; INTRAVENOUS
Status: COMPLETED | OUTPATIENT
Start: 2020-08-02 | End: 2020-08-02

## 2020-08-02 RX ORDER — PANTOPRAZOLE SODIUM 40 MG/10ML
40 INJECTION, POWDER, LYOPHILIZED, FOR SOLUTION INTRAVENOUS
Status: COMPLETED | OUTPATIENT
Start: 2020-08-02 | End: 2020-08-02

## 2020-08-02 RX ORDER — ONDANSETRON 4 MG/1
4 TABLET, FILM COATED ORAL EVERY 8 HOURS PRN
Qty: 12 TABLET | Refills: 0 | Status: SHIPPED | OUTPATIENT
Start: 2020-08-02

## 2020-08-02 RX ORDER — QUETIAPINE FUMARATE 100 MG/1
100 TABLET, FILM COATED ORAL
COMMUNITY
Start: 2019-05-03

## 2020-08-02 RX ORDER — DICYCLOMINE HYDROCHLORIDE 10 MG/ML
20 INJECTION INTRAMUSCULAR
Status: COMPLETED | OUTPATIENT
Start: 2020-08-02 | End: 2020-08-02

## 2020-08-02 RX ORDER — FLUTICASONE FUROATE AND VILANTEROL 200; 25 UG/1; UG/1
1 POWDER RESPIRATORY (INHALATION)
COMMUNITY
Start: 2018-11-16

## 2020-08-02 RX ORDER — DICYCLOMINE HYDROCHLORIDE 20 MG/1
20 TABLET ORAL
Qty: 28 TABLET | Refills: 0 | Status: SHIPPED | OUTPATIENT
Start: 2020-08-02 | End: 2020-08-09

## 2020-08-02 RX ADMIN — SODIUM CHLORIDE 2448 ML: 0.9 INJECTION, SOLUTION INTRAVENOUS at 06:08

## 2020-08-02 RX ADMIN — ACETAMINOPHEN 1000 MG: 500 TABLET ORAL at 06:08

## 2020-08-02 RX ADMIN — PANTOPRAZOLE SODIUM 40 MG: 40 INJECTION, POWDER, FOR SOLUTION INTRAVENOUS at 04:08

## 2020-08-02 RX ADMIN — DICYCLOMINE HYDROCHLORIDE 20 MG: 20 INJECTION, SOLUTION INTRAMUSCULAR at 04:08

## 2020-08-02 RX ADMIN — ONDANSETRON 4 MG: 2 INJECTION INTRAMUSCULAR; INTRAVENOUS at 04:08

## 2020-08-02 NOTE — ED NOTES
Pt given a urine specimen cup and instructed on need for urine sample. States unable to provide a sample at this time.

## 2020-08-02 NOTE — ED TRIAGE NOTES
Pt c/o abdominal pain, diarrhea x5 days headache x1 day. Denies SOB, cough, fever, N/V, dysuria. Pain is 7/10. Pt took kaopectate PTA to no relief

## 2020-08-02 NOTE — ED PROVIDER NOTES
Encounter Date: 8/2/2020    SCRIBE #1 NOTE: I, Casie Aliya, am scribing for, and in the presence of, Bernardo Majano DNP.       History     Chief Complaint   Patient presents with    Abdominal Pain     Pt c/o abdominal pain, diarrhea x5 days headache x1 day. Denies SOB, cough, fever, N/V, dysuria. Pain is 7/10    Diarrhea     Time seen by the provider: (4:28 PM).  Patient is a 24 year old male who presents to the ED with complaint of central abdominal pain that began 4 days ago after drinking almond milk. Patient describes this pain as tight. Pain rated 7/10. He states that he is allergic to nuts, but did not experience any swelling. Associated symptoms include cramping, diarrhea, and nausea. He describes the diarrhea as brown and watery. Denies blood in stool, fever, chills, congestion, ear pain, eye itching, eye discharge, shortness of breath, chest pain, rashes, dysuria, vomiting, or changes in appetite.    The history is provided by the patient.     Review of patient's allergies indicates:   Allergen Reactions    Peanut Anaphylaxis     Past Medical History:   Diagnosis Date    Asthma     Eczema     Schizo affective schizophrenia      Past Surgical History:   Procedure Laterality Date    colon surgery       Family History   Problem Relation Age of Onset    Kidney disease Father     Hypertension Father      Social History     Tobacco Use    Smoking status: Current Every Day Smoker     Packs/day: 0.50     Types: Cigarettes    Smokeless tobacco: Never Used   Substance Use Topics    Alcohol use: Yes    Drug use: Yes     Types: Marijuana     Review of Systems   Constitutional: Negative for appetite change, chills and fever.   HENT: Negative for congestion, ear pain and sore throat.    Eyes: Negative for discharge and itching.   Respiratory: Negative for shortness of breath.    Cardiovascular: Negative for chest pain.   Gastrointestinal: Positive for diarrhea and nausea. Negative for blood in stool and  vomiting.   Genitourinary: Negative for dysuria.   Musculoskeletal: Negative for back pain.   Skin: Negative for rash.   Neurological: Negative for weakness.   Hematological: Does not bruise/bleed easily.       Physical Exam     Initial Vitals [08/02/20 1536]   BP Pulse Resp Temp SpO2   123/66 107 18 99 °F (37.2 °C) 98 %      MAP       --         Physical Exam    Nursing note and vitals reviewed.  Constitutional: He appears well-developed and well-nourished. He is not diaphoretic. No distress.   HENT:   Head: Normocephalic and atraumatic.   Mouth/Throat: Oropharynx is clear and moist. No oropharyngeal exudate.   Eyes: Conjunctivae and EOM are normal. Pupils are equal, round, and reactive to light. No scleral icterus.   Neck: Normal range of motion. Neck supple. No JVD present.   Cardiovascular: Normal rate, regular rhythm, normal heart sounds and intact distal pulses.   Pulmonary/Chest: Breath sounds normal. No stridor. No respiratory distress. He has no wheezes. He has no rales.   Abdominal: Soft. Bowel sounds are normal. He exhibits no distension. There is no abdominal tenderness. There is no rebound and no guarding.   Musculoskeletal: Normal range of motion. No tenderness or edema.   Neurological: He is alert and oriented to person, place, and time. He has normal strength. No cranial nerve deficit or sensory deficit. GCS score is 15. GCS eye subscore is 4. GCS verbal subscore is 5. GCS motor subscore is 6.   Skin: Skin is warm and dry. No rash noted.   Psychiatric: He has a normal mood and affect. Thought content normal.         ED Course   Critical Care    Date/Time: 8/2/2020 5:50 PM  Performed by: Pradeep Majano  Authorized by: Carl Post MD   Direct patient critical care time: 20 minutes  Additional history critical care time: 10 minutes  Ordering / reviewing critical care time: 10 minutes  Documentation critical care time: 10 minutes  Total critical care time (exclusive of procedural time) : 50  minutes  Critical care time was exclusive of separately billable procedures and treating other patients.  Critical care was necessary to treat or prevent imminent or life-threatening deterioration of the following conditions: sepsis.  Critical care was time spent personally by me on the following activities: blood draw for specimens, development of treatment plan with patient or surrogate, interpretation of cardiac output measurements, evaluation of patient's response to treatment, obtaining history from patient or surrogate, ordering and performing treatments and interventions, ordering and review of radiographic studies, pulse oximetry, re-evaluation of patient's condition and review of old charts.        Labs Reviewed   CBC W/ AUTO DIFFERENTIAL - Abnormal; Notable for the following components:       Result Value    WBC 16.95 (*)     Gran # (ANC) 12.5 (*)     Immature Grans (Abs) 0.09 (*)     Mono # 1.9 (*)     Gran% 73.6 (*)     Lymph% 12.3 (*)     All other components within normal limits   COMPREHENSIVE METABOLIC PANEL - Abnormal; Notable for the following components:    Sodium 134 (*)     All other components within normal limits   URINALYSIS, REFLEX TO URINE CULTURE - Abnormal; Notable for the following components:    Occult Blood UA 1+ (*)     All other components within normal limits    Narrative:     Specimen Source->Urine   URINALYSIS MICROSCOPIC - Abnormal; Notable for the following components:    RBC, UA 9 (*)     All other components within normal limits    Narrative:     Specimen Source->Urine   CULTURE, BLOOD    Narrative:     Aerobic and anaerobic   CULTURE, BLOOD    Narrative:     Aerobic and anaerobic   LIPASE   LACTIC ACID, PLASMA   SARS-COV-2 (COVID-19) QUALITATIVE PCR        ECG Results          EKG 12-lead (In process)  Result time 08/03/20 07:03:08    In process by Interface, Lab In [facility] (08/03/20 07:03:08)                 Narrative:    Test Reason : A41.9,    Vent. Rate : 085 BPM      Atrial Rate : 085 BPM     P-R Int : 134 ms          QRS Dur : 080 ms      QT Int : 340 ms       P-R-T Axes : 072 073 034 degrees     QTc Int : 404 ms    Normal sinus rhythm  Normal ECG  When compared with ECG of 07-MAY-2018 09:20,  No significant change was found    Referred By: AAAREFRONNIE   SELF           Confirmed By:                   In process by Interface, Lab In [facility] (08/03/20 07:00:35)                 Narrative:    Test Reason : A41.9,    Vent. Rate : 085 BPM     Atrial Rate : 085 BPM     P-R Int : 134 ms          QRS Dur : 080 ms      QT Int : 340 ms       P-R-T Axes : 072 073 034 degrees     QTc Int : 404 ms    Normal sinus rhythm  Normal ECG  When compared with ECG of 07-MAY-2018 09:20,  No significant change was found    Referred By: AAAREFRONNIE   SELF           Confirmed By:                             Imaging Results          X-Ray Chest AP Portable (Final result)  Result time 08/02/20 18:44:22    Final result by Daryl Fine MD (08/02/20 18:44:22)                 Impression:      No acute cardiopulmonary process identified.      Electronically signed by: Daryl Fine MD  Date:    08/02/2020  Time:    18:44             Narrative:    EXAMINATION:  XR CHEST AP PORTABLE    CLINICAL HISTORY:  Sepsis;    TECHNIQUE:  Single frontal view of the chest was performed.    COMPARISON:  May 2018.    FINDINGS:  Cardiac silhouette is normal in size.  Lungs are symmetrically expanded.  No evidence of focal consolidative process, pneumothorax, or significant pleural effusion.  No acute osseous abnormality identified.                                 Medical Decision Making:   History:   Old Medical Records: I decided to obtain old medical records.  Initial Assessment:   24 year old male presents to the ED complaining of central abdominal pain ongoing for 4 days. The patient was seen and examined. The history and physical exam was obtained. The nursing notes and vital signs were reviewed. Secondary to symptoms and  exam findings, I ordered CMP, CBC, lipase, and urinalysis.   Clinical Tests:   Lab Tests: Ordered and Reviewed  Radiological Study: Ordered and Reviewed  Medical Tests: Ordered and Reviewed            Scribe Attestation:   Scribe #1: I performed the above scribed service and the documentation accurately describes the services I performed. I attest to the accuracy of the note.            ED Course as of Aug 03 1015   Sun Aug 02, 2020   1640 CBC: leukocyte count was increased, the H&H was normal. The platelet count was normal.       CBC W/ AUTO DIFFERENTIAL(!) [VC]   1712 The chemistry was negative for hypo-or hyper natremia, kalemia, chloridemia, or other electrolyte abnormalities; BUN and creatinine were within normal limits indicating normal kidney function, ALT and AST were within normal limits indicating normal liver function, there was no transaminitis.       Comp. Metabolic Panel(!) [VC]   1712 Lipase: 20 [VC]   1736 Temp(!): 101 °F (38.3 °C) [VC]   1758 Unlikely uti.   Urinalysis Microscopic(!) [VC]   1805 EKG 85, NSR, no ectopy, no stemi.    [VC]   1854 No acute cardiopulmonary process identified.   X-Ray Chest AP Portable [VC]   1904 Pending at time of disposition as are all cultures.   Blood culture x two cultures. Draw prior to antibiotics. [VC]   1916 Awaiting lactic acid and repeat vitals for disposition.    [VC]   1923 BP: 133/75 [VC]   1923 Temp: 99.5 °F (37.5 °C) [VC]   1923 Temp src: Oral [VC]   1923 Pulse: 83 [VC]   1923 Resp: 18 [VC]   1923 SpO2: 100 % [VC]   1933 BP: 133/75 [VC]   1933 Temp: 99.5 °F (37.5 °C) [VC]   1933 Temp src: Oral [VC]   1933 Pulse: 83 [VC]   1933 Resp: 18 [VC]   1933 SpO2: 100 % [VC]   1942 Lactate, [place]: 0.9 [VC]      ED Course User Index  [VC] Pradeep Majano DNP     During the patient's stay secondary to temperature, white blood cell count, clinical presentation and symptomatology I initiated a sepsis alert.  Additional orders were added.  The patient's lactic acid was  not aligning and therefore upon receipt of the remainder of the laboratory the patient was discharged home in good condition to follow up with primary care.  He should return for any worsening or changes in condition.  I believe this is most likely a viral syndrome. Care of the patient discussed with Dr. Post who agreed with the assessment and plan.            Clinical Impression:       ICD-10-CM ICD-9-CM   1. Febrile illness  R50.9 780.60   2. Diarrhea, unspecified type  R19.7 787.91   3. Nonintractable headache, unspecified chronicity pattern, unspecified headache type  R51 784.0         Disposition:   Disposition: Discharged  Condition: Stable     ED Disposition Condition    Discharge Stable        ED Prescriptions     Medication Sig Dispense Start Date End Date Auth. Provider    dicyclomine (BENTYL) 20 mg tablet Take 1 tablet (20 mg total) by mouth 4 (four) times daily before meals and nightly. for 7 days 28 tablet 8/2/2020 8/9/2020 Pradeep Majano DNP    ondansetron (ZOFRAN) 4 MG tablet Take 1 tablet (4 mg total) by mouth every 8 (eight) hours as needed for Nausea (and vomiting). 12 tablet 8/2/2020  Pradeep Majano DNP        Follow-up Information     Follow up With Specialties Details Why Contact Info    Desire Newby MD Pediatrics Schedule an appointment as soon as possible for a visit   [address]  [facility]  [place] [address]  [phone]                        MARIA ALEJANDRA DIAZ DNP ACNP-BC FNP-C , personally performed the services described in this documentation. All medical record entries made by the scribe were at my direction and in my presence. I have reviewed the chart and agree that the record reflects my personal performance and is accurate and complete.

## 2020-08-03 LAB — SARS-COV-2 RNA RESP QL NAA+PROBE: NOT DETECTED

## 2020-08-07 LAB
BACTERIA BLD CULT: NORMAL
BACTERIA BLD CULT: NORMAL

## 2020-12-27 ENCOUNTER — OFFICE VISIT (OUTPATIENT)
Dept: URGENT CARE | Facility: CLINIC | Age: 25
End: 2020-12-27
Payer: MEDICAID

## 2020-12-27 VITALS
HEART RATE: 79 BPM | OXYGEN SATURATION: 98 % | DIASTOLIC BLOOD PRESSURE: 70 MMHG | SYSTOLIC BLOOD PRESSURE: 127 MMHG | TEMPERATURE: 99 F

## 2020-12-27 DIAGNOSIS — S20.212A RIB CONTUSION, LEFT, INITIAL ENCOUNTER: Primary | ICD-10-CM

## 2020-12-27 DIAGNOSIS — R52 PAIN: ICD-10-CM

## 2020-12-27 PROCEDURE — 99213 OFFICE O/P EST LOW 20 MIN: CPT | Mod: S$GLB,,, | Performed by: NURSE PRACTITIONER

## 2020-12-27 PROCEDURE — 71101 X-RAY EXAM UNILAT RIBS/CHEST: CPT | Mod: LT,S$GLB,, | Performed by: RADIOLOGY

## 2020-12-27 PROCEDURE — 99213 PR OFFICE/OUTPT VISIT, EST, LEVL III, 20-29 MIN: ICD-10-PCS | Mod: S$GLB,,, | Performed by: NURSE PRACTITIONER

## 2020-12-27 PROCEDURE — 71101 XR RIBS MIN 3 VIEWS W/ PA CHEST LEFT: ICD-10-PCS | Mod: LT,S$GLB,, | Performed by: RADIOLOGY

## 2020-12-27 NOTE — PROGRESS NOTES
Subjective:       Patient ID: Jensen Bland is a 25 y.o. male.    Vitals:  temperature is 98.5 °F (36.9 °C). His blood pressure is 127/70 and his pulse is 79. His oxygen saturation is 98%.     Chief Complaint: Flank Pain    Pt states that he fell about 5 days ago and injured his left ribs . Pain level at 8      Chest Pain   This is a new problem. The current episode started in the past 7 days. The problem occurs constantly. The problem has been unchanged. The pain is at a severity of 8/10. The pain is moderate. The quality of the pain is described as stabbing. The pain does not radiate. Pertinent negatives include no abdominal pain or dizziness. He has tried nothing for the symptoms.       Constitution: Negative for fatigue.   HENT: Negative for facial swelling and facial trauma.    Neck: Negative for neck stiffness.   Cardiovascular: Positive for chest pain. Negative for chest trauma.   Eyes: Negative for eye trauma, double vision and blurred vision.   Gastrointestinal: Negative for abdominal trauma, abdominal pain and rectal bleeding.   Genitourinary: Negative for hematuria, genital trauma and pelvic pain.   Musculoskeletal: Positive for pain. Negative for trauma, joint swelling, abnormal ROM of joint and pain with walking.   Skin: Negative for color change, wound, abrasion and laceration.   Neurological: Negative for dizziness, history of vertigo, light-headedness, coordination disturbances, altered mental status and loss of consciousness.   Hematologic/Lymphatic: Negative for history of bleeding disorder.   Psychiatric/Behavioral: Negative for altered mental status.       Objective:      Physical Exam   Constitutional: He is oriented to person, place, and time.   HENT:   Head: Normocephalic and atraumatic.   Cardiovascular: Bradycardia present.   Pulmonary/Chest: Effort normal. No respiratory distress. He exhibits bony tenderness and swelling.       Abdominal: Normal appearance.   Neurological: He is alert  and oriented to person, place, and time.   Skin: Skin is dry. Psychiatric: His behavior is normal. Mood normal.         Xr Rib Left W/ Pa Chest    Result Date: 12/27/2020  EXAMINATION: XR RIBS MIN 3 VIEWS W/ PA CHEST LEFT CLINICAL HISTORY: Pain, unspecified COMPARISON: 08/02/2020 FINDINGS: PA chest, four views ribs. The cardiomediastinal silhouette is not enlarged.  There is no pleural effusion.  The trachea is midline.  The lungs are symmetrically expanded bilaterally without evidence of acute parenchymal process. No large focal consolidation seen.  There is no pneumothorax. No acute displaced rib fracture.     1. No acute cardiopulmonary process, no acute displaced rib fracture. Electronically signed by: Rocael Cote MD Date:    12/27/2020 Time:    12:50  Assessment:       1. Rib contusion, left, initial encounter    2. Pain        Plan:       No rib fracture noted on x-ray.  Likely a bruise.  Ice and Tylenol for pain.      Patient Instructions     Rib Contusion     A rib contusion is a bruise to one or more rib bones. It may cause pain, tenderness, swelling and a purplish discoloration. There may be a sharp pain while breathing.  You will be assessed for other injuries. You will likely be given pain medicine. Rib contusions heal on their own, without further treatment. However, pain may take weeks to months to go away.   Note that a small crack (fracture) in the rib may cause the same symptoms as a rib contusion. The small crack may not be seen on a chest X-ray. However, the conditions are managed in the same way.  Home care  · Rest. Avoid heavy lifting, strenuous exertion, or any activity that causes pain.  · Ice the area to reduce pain and swelling. Put ice cubes in a plastic bag or use a cold pack. (Wrap the cold source in a thin towel. Do not place it directly on your skin.) Ice the injured area for 20 minutes every 1 to 2 hours the first day. Continue with ice packs 3 to 4 times a day for the next 2 days,  then as needed for the relief of pain and swelling.  · Take any prescribed pain medicine as directed by your healthcare provider. If none was prescribed, take acetaminophen, ibuprofen, or naproxen to control pain.  · If you have a significant injury, you may be given a device called an incentive spirometer to keep your lungs healthy. Use as directed.  Follow-up care  Follow up with your healthcare provider during the next week or as directed.  When to seek medical advice  Call your healthcare provider for any of the following:  · Shortness of breath or trouble breathing  · Increasing chest pain with breathing  · Coughing  · Dizziness, weakness, or fainting  · New or worsening pain  · Fever of 100.4°F (38ºC) or higher, or as directed by your healthcare provider  Date Last Reviewed: 2/1/2017  © 8891-8908 [employer]. [address] [place] [id number]. All rights reserved. This information is not intended as a substitute for professional medical care. Always follow your healthcare professional's instructions.            Rib contusion, left, initial encounter    Pain  -     XR RIB LEFT W/ PA CHEST; Future; Expected date: 12/27/2020

## 2020-12-27 NOTE — PATIENT INSTRUCTIONS
Rib Contusion     A rib contusion is a bruise to one or more rib bones. It may cause pain, tenderness, swelling and a purplish discoloration. There may be a sharp pain while breathing.  You will be assessed for other injuries. You will likely be given pain medicine. Rib contusions heal on their own, without further treatment. However, pain may take weeks to months to go away.   Note that a small crack (fracture) in the rib may cause the same symptoms as a rib contusion. The small crack may not be seen on a chest X-ray. However, the conditions are managed in the same way.  Home care  · Rest. Avoid heavy lifting, strenuous exertion, or any activity that causes pain.  · Ice the area to reduce pain and swelling. Put ice cubes in a plastic bag or use a cold pack. (Wrap the cold source in a thin towel. Do not place it directly on your skin.) Ice the injured area for 20 minutes every 1 to 2 hours the first day. Continue with ice packs 3 to 4 times a day for the next 2 days, then as needed for the relief of pain and swelling.  · Take any prescribed pain medicine as directed by your healthcare provider. If none was prescribed, take acetaminophen, ibuprofen, or naproxen to control pain.  · If you have a significant injury, you may be given a device called an incentive spirometer to keep your lungs healthy. Use as directed.  Follow-up care  Follow up with your healthcare provider during the next week or as directed.  When to seek medical advice  Call your healthcare provider for any of the following:  · Shortness of breath or trouble breathing  · Increasing chest pain with breathing  · Coughing  · Dizziness, weakness, or fainting  · New or worsening pain  · Fever of 100.4°F (38ºC) or higher, or as directed by your healthcare provider  Date Last Reviewed: 2/1/2017  © 2826-9205 [employer] [address] [place] [address], [place] [address]. All rights reserved. This information is not intended as a substitute for  professional medical care. Always follow your healthcare professional's instructions.

## 2020-12-27 NOTE — LETTER
December 27, 2020      [facility] [address], [place]  [place] [address]  Phone: [phone]  Fax: [phone]       Patient: Jensen Bland   YOB: 1995  Date of Visit: 12/27/2020    To Whom It May Concern:    Megan Bland  was at [facility] on 12/27/2020. He may return to work/school on 12/30 with no restrictions. If you have any questions or concerns, or if I can be of further assistance, please do not hesitate to contact me.    Sincerely,    Sera Serra, NP

## 2020-12-31 ENCOUNTER — HOSPITAL ENCOUNTER (EMERGENCY)
Facility: HOSPITAL | Age: 25
Discharge: HOME | End: 2020-12-31
Attending: EMERGENCY MEDICINE
Payer: MEDICAID

## 2020-12-31 VITALS
HEIGHT: 64 IN | HEART RATE: 76 BPM | SYSTOLIC BLOOD PRESSURE: 120 MMHG | WEIGHT: 180 LBS | TEMPERATURE: 98 F | DIASTOLIC BLOOD PRESSURE: 62 MMHG | RESPIRATION RATE: 16 BRPM | OXYGEN SATURATION: 98 % | BODY MASS INDEX: 30.73 KG/M2

## 2020-12-31 DIAGNOSIS — S20.212D RIB CONTUSION, LEFT, SUBSEQUENT ENCOUNTER: Primary | ICD-10-CM

## 2020-12-31 PROCEDURE — 99284 EMERGENCY DEPT VISIT MOD MDM: CPT

## 2020-12-31 PROCEDURE — 25000003 PHARM REV CODE 250: Performed by: PHYSICIAN ASSISTANT

## 2020-12-31 RX ORDER — MELOXICAM 7.5 MG/1
7.5 TABLET ORAL DAILY
Qty: 12 TABLET | Refills: 0 | Status: SHIPPED | OUTPATIENT
Start: 2020-12-31

## 2020-12-31 RX ORDER — ORPHENADRINE CITRATE 100 MG/1
100 TABLET, EXTENDED RELEASE ORAL 2 TIMES DAILY
Qty: 8 TABLET | Refills: 0 | Status: SHIPPED | OUTPATIENT
Start: 2020-12-31 | End: 2021-01-04

## 2020-12-31 RX ORDER — IBUPROFEN 600 MG/1
600 TABLET ORAL
Status: COMPLETED | OUTPATIENT
Start: 2020-12-31 | End: 2020-12-31

## 2020-12-31 RX ORDER — LIDOCAINE 50 MG/G
1 PATCH TOPICAL ONCE
Status: DISCONTINUED | OUTPATIENT
Start: 2020-12-31 | End: 2020-12-31 | Stop reason: HOSPADM

## 2020-12-31 RX ORDER — LIDOCAINE 50 MG/G
1 PATCH TOPICAL DAILY
Qty: 6 PATCH | Refills: 0 | Status: SHIPPED | OUTPATIENT
Start: 2020-12-31

## 2020-12-31 RX ADMIN — IBUPROFEN 600 MG: 600 TABLET, FILM COATED ORAL at 09:12

## 2020-12-31 RX ADMIN — LIDOCAINE 1 PATCH: 50 PATCH TOPICAL at 09:12

## 2021-08-05 ENCOUNTER — IMMUNIZATION (OUTPATIENT)
Dept: OBSTETRICS AND GYNECOLOGY | Facility: CLINIC | Age: 26
End: 2021-08-05
Payer: MEDICAID

## 2021-08-05 DIAGNOSIS — Z23 NEED FOR VACCINATION: Primary | ICD-10-CM

## 2021-08-05 PROCEDURE — 91300 COVID-19, MRNA, LNP-S, PF, 30 MCG/0.3 ML DOSE VACCINE: ICD-10-PCS | Mod: ,,, | Performed by: FAMILY MEDICINE

## 2021-08-05 PROCEDURE — 91300 COVID-19, MRNA, LNP-S, PF, 30 MCG/0.3 ML DOSE VACCINE: CPT | Mod: ,,, | Performed by: FAMILY MEDICINE

## 2021-08-05 PROCEDURE — 0001A COVID-19, MRNA, LNP-S, PF, 30 MCG/0.3 ML DOSE VACCINE: ICD-10-PCS | Mod: CV19,,, | Performed by: FAMILY MEDICINE

## 2021-08-05 PROCEDURE — 0001A COVID-19, MRNA, LNP-S, PF, 30 MCG/0.3 ML DOSE VACCINE: CPT | Mod: CV19,,, | Performed by: FAMILY MEDICINE

## 2021-08-27 ENCOUNTER — IMMUNIZATION (OUTPATIENT)
Dept: OBSTETRICS AND GYNECOLOGY | Facility: CLINIC | Age: 26
End: 2021-08-27
Payer: MEDICAID

## 2021-08-27 DIAGNOSIS — Z23 NEED FOR VACCINATION: Primary | ICD-10-CM

## 2021-08-27 PROCEDURE — 0002A COVID-19, MRNA, LNP-S, PF, 30 MCG/0.3 ML DOSE VACCINE: CPT | Mod: CV19,,, | Performed by: FAMILY MEDICINE

## 2021-08-27 PROCEDURE — 91300 COVID-19, MRNA, LNP-S, PF, 30 MCG/0.3 ML DOSE VACCINE: CPT | Mod: ,,, | Performed by: FAMILY MEDICINE

## 2021-08-27 PROCEDURE — 91300 COVID-19, MRNA, LNP-S, PF, 30 MCG/0.3 ML DOSE VACCINE: ICD-10-PCS | Mod: ,,, | Performed by: FAMILY MEDICINE

## 2021-08-27 PROCEDURE — 0002A COVID-19, MRNA, LNP-S, PF, 30 MCG/0.3 ML DOSE VACCINE: ICD-10-PCS | Mod: CV19,,, | Performed by: FAMILY MEDICINE

## 2021-12-31 ENCOUNTER — LAB VISIT (OUTPATIENT)
Dept: PRIMARY CARE CLINIC | Facility: OTHER | Age: 26
End: 2021-12-31
Attending: INTERNAL MEDICINE
Payer: MEDICAID

## 2021-12-31 DIAGNOSIS — Z20.822 ENCOUNTER FOR LABORATORY TESTING FOR COVID-19 VIRUS: ICD-10-CM

## 2021-12-31 PROCEDURE — U0003 INFECTIOUS AGENT DETECTION BY NUCLEIC ACID (DNA OR RNA); SEVERE ACUTE RESPIRATORY SYNDROME CORONAVIRUS 2 (SARS-COV-2) (CORONAVIRUS DISEASE [COVID-19]), AMPLIFIED PROBE TECHNIQUE, MAKING USE OF HIGH THROUGHPUT TECHNOLOGIES AS DESCRIBED BY CMS-2020-01-R: HCPCS | Mod: ST72 | Performed by: INTERNAL MEDICINE

## 2022-01-04 LAB
SARS-COV-2 RNA RESP QL NAA+PROBE: NOT DETECTED
SARS-COV-2- CYCLE NUMBER: NORMAL

## 2022-04-28 ENCOUNTER — HOSPITAL ENCOUNTER (EMERGENCY)
Facility: HOSPITAL | Age: 27
Discharge: HOME | End: 2022-04-28
Attending: EMERGENCY MEDICINE
Payer: MEDICAID

## 2022-04-28 VITALS
RESPIRATION RATE: 16 BRPM | BODY MASS INDEX: 28.93 KG/M2 | TEMPERATURE: 98 F | OXYGEN SATURATION: 97 % | HEIGHT: 66 IN | DIASTOLIC BLOOD PRESSURE: 61 MMHG | HEART RATE: 89 BPM | SYSTOLIC BLOOD PRESSURE: 119 MMHG | WEIGHT: 180 LBS

## 2022-04-28 DIAGNOSIS — K60.2 ANAL FISSURE: Primary | ICD-10-CM

## 2022-04-28 PROCEDURE — 99283 EMERGENCY DEPT VISIT LOW MDM: CPT

## 2022-04-28 RX ORDER — POLYETHYLENE GLYCOL 3350 17 G/17G
17 POWDER, FOR SOLUTION ORAL DAILY
Qty: 100 EACH | Refills: 0 | Status: SHIPPED | OUTPATIENT
Start: 2022-04-28

## 2022-04-28 NOTE — ED PROVIDER NOTES
Encounter Date: 4/28/2022    SCRIBE #1 NOTE: I, Sahara Manuel-Selvin and am scribing for, and in the presence of, Phil Ortega MD.       History     Chief Complaint   Patient presents with    Rectal Bleeding     Pt chief complaint is a rectal bleeding, pt states had spots of blood when wiped after a bowel movement.      26 year old male with a PMHx of schizophrenia and asthma presents to the ED with complaints of blood in his stool for the past day. The patient states he noticed the blood after using the bathroom at work today. He has associated symptoms of nausea, weakness, and decreased urination. The patient endorses a history of blood in his stool and recalls going to the hospital for the issue. He is unsure of the outcome of the last visit. The patient denies any abdominal pain, dizziness, lightheadedness, or any other associated symptoms.    The history is provided by the patient. No  was used.     Review of patient's allergies indicates:   Allergen Reactions    Peanut Anaphylaxis     Past Medical History:   Diagnosis Date    Asthma     Eczema     Schizo affective schizophrenia      Past Surgical History:   Procedure Laterality Date    colon surgery       Family History   Problem Relation Age of Onset    Kidney disease Father     Hypertension Father      Social History     Tobacco Use    Smoking status: Current Every Day Smoker     Packs/day: 0.50     Types: Cigarettes    Smokeless tobacco: Never Used   Substance Use Topics    Alcohol use: Yes    Drug use: Not Currently     Types: Marijuana     Review of Systems   Constitutional: Negative.    HENT: Negative.    Eyes: Negative.    Respiratory: Negative.    Cardiovascular: Negative.    Gastrointestinal: Positive for blood in stool and nausea. Negative for abdominal distention, abdominal pain, anal bleeding, constipation, diarrhea, rectal pain and vomiting.   Genitourinary: Positive for decreased urine volume. Negative for  difficulty urinating, dysuria, enuresis, flank pain, frequency, genital sores, hematuria, penile discharge, penile pain, penile swelling, scrotal swelling, testicular pain and urgency.   Musculoskeletal: Negative.    Skin: Negative.    Neurological: Positive for weakness. Negative for dizziness, tremors, seizures, syncope, facial asymmetry, speech difficulty, light-headedness, numbness and headaches.       Physical Exam     Initial Vitals [04/28/22 1346]   BP Pulse Resp Temp SpO2   119/61 89 16 98.1 °F (36.7 °C) 97 %      MAP       --         Physical Exam    Nursing note and vitals reviewed.  Constitutional: He appears well-developed and well-nourished. He is not diaphoretic. No distress.   HENT:   Head: Normocephalic and atraumatic.   Nose: Nose normal.   Mouth/Throat: No oropharyngeal exudate.   Eyes: EOM are normal. Pupils are equal, round, and reactive to light.   Neck: Neck supple. No tracheal deviation present. No JVD present.   Normal range of motion.  Cardiovascular: Normal rate and regular rhythm.   Pulmonary/Chest: No respiratory distress.   Abdominal: Abdomen is soft. Bowel sounds are normal. He exhibits no distension. There is no abdominal tenderness. There is no rebound and no guarding.   Genitourinary:    Genitourinary Comments: No hemorrhoids present, small anal fissure noted at 6 o'clock region, hemostatic, no gross blood noted.     Musculoskeletal:         General: No tenderness or edema. Normal range of motion.      Cervical back: Normal range of motion and neck supple.     Neurological: He is alert and oriented to person, place, and time. He has normal strength.   Skin: Skin is warm and dry. Capillary refill takes less than 2 seconds. No rash noted. No erythema.         ED Course   Procedures  Labs Reviewed - No data to display       Imaging Results    None          Medications - No data to display        MDM:    26-year-old male with past medical history as noted above presenting with bright red  blood per rectum.  Patient noted to have an anal fissure after some reported hard stool earlier this morning.  Vital signs otherwise stable, abdominal exam otherwise benign.  Patient advised on stool softeners, continued observation and strict ED return precautions discussed.  At this point time based on physical exam evaluation not suspect coagulopathy, hemorrhoid, lower GI bleed, diverticular bleed, ischemic colitis, Crohn's/ulcerative colitis, or any further surgical or medical emergency.  Discussed diagnosis and further treatment with patient, including f/u.  Return precautions given and all questions answered.  Patient in understanding of plan.  Pt discharged to home improved and stable.              Scribe Attestation:   Scribe #1: I performed the above scribed service and the documentation accurately describes the services I performed. I attest to the accuracy of the note.                 Clinical Impression:   Final diagnoses:  [K60.2] Anal fissure (Primary)         I, Phil Ortega M.D., personally performed the services described in this documentation. All medical record entries made by the scribe were at my direction and in my presence. I have reviewed the chart and agree that the record reflects my personal performance and is accurate and complete.       ED Disposition Condition    Discharge Stable        ED Prescriptions     Medication Sig Dispense Start Date End Date Auth. Provider    polyethylene glycol (GLYCOLAX) 17 gram PwPk Take 17 g by mouth once daily. 100 each 4/28/2022  Phil Ortega MD        Follow-up Information     Follow up With Specialties Details Why Contact Info    [facility] [place] - Emergency Dept Emergency Medicine Go to  If symptoms worsen [id number]  [facility] [address] [phone]    Desire Newby MD Pediatrics Go in 1 week As needed [address]  [facility] [address]  [phone]             Phil Ortega,  MD  05/05/22 3200

## 2022-04-28 NOTE — Clinical Note
"Jensen Bland (Deonte) was seen and treated in our emergency department on 4/28/2022.  He may return to work on 04/29/2022.       If you have any questions or concerns, please don't hesitate to call.       LPN    "

## 2022-04-29 ENCOUNTER — PATIENT OUTREACH (OUTPATIENT)
Dept: EMERGENCY MEDICINE | Facility: HOSPITAL | Age: 27
End: 2022-04-29
Payer: MEDICAID

## 2022-04-29 ENCOUNTER — HOSPITAL ENCOUNTER (EMERGENCY)
Facility: HOSPITAL | Age: 27
Discharge: HOME | End: 2022-04-29
Attending: EMERGENCY MEDICINE
Payer: MEDICAID

## 2022-04-29 VITALS
SYSTOLIC BLOOD PRESSURE: 119 MMHG | RESPIRATION RATE: 18 BRPM | BODY MASS INDEX: 28.93 KG/M2 | TEMPERATURE: 98 F | HEIGHT: 66 IN | WEIGHT: 180 LBS | DIASTOLIC BLOOD PRESSURE: 71 MMHG | OXYGEN SATURATION: 99 % | HEART RATE: 74 BPM

## 2022-04-29 DIAGNOSIS — K62.5 RECTAL BLEEDING: Primary | ICD-10-CM

## 2022-04-29 LAB
ALBUMIN SERPL BCP-MCNC: 4.1 G/DL (ref 3.5–5.2)
ALP SERPL-CCNC: 74 U/L (ref 55–135)
ALT SERPL W/O P-5'-P-CCNC: 24 U/L (ref 10–44)
ANION GAP SERPL CALC-SCNC: 12 MMOL/L (ref 8–16)
AST SERPL-CCNC: 32 U/L (ref 10–40)
BASOPHILS # BLD AUTO: 0.05 K/UL (ref 0–0.2)
BASOPHILS NFR BLD: 0.6 % (ref 0–1.9)
BILIRUB SERPL-MCNC: 0.4 MG/DL (ref 0.1–1)
BILIRUB UR QL STRIP: NEGATIVE
BUN SERPL-MCNC: 8 MG/DL (ref 6–20)
CALCIUM SERPL-MCNC: 9.1 MG/DL (ref 8.7–10.5)
CHLORIDE SERPL-SCNC: 105 MMOL/L (ref 95–110)
CLARITY UR: CLEAR
CO2 SERPL-SCNC: 22 MMOL/L (ref 23–29)
COLOR UR: YELLOW
CREAT SERPL-MCNC: 1 MG/DL (ref 0.5–1.4)
DIFFERENTIAL METHOD: ABNORMAL
EOSINOPHIL # BLD AUTO: 0.5 K/UL (ref 0–0.5)
EOSINOPHIL NFR BLD: 6.1 % (ref 0–8)
ERYTHROCYTE [DISTWIDTH] IN BLOOD BY AUTOMATED COUNT: 13.4 % (ref 11.5–14.5)
EST. GFR  (AFRICAN AMERICAN): >60 ML/MIN/1.73 M^2
EST. GFR  (NON AFRICAN AMERICAN): >60 ML/MIN/1.73 M^2
GLUCOSE SERPL-MCNC: 101 MG/DL (ref 70–110)
GLUCOSE UR QL STRIP: NEGATIVE
HCT VFR BLD AUTO: 40.4 % (ref 40–54)
HGB BLD-MCNC: 13.5 G/DL (ref 14–18)
HGB UR QL STRIP: NEGATIVE
IMM GRANULOCYTES # BLD AUTO: 0.03 K/UL (ref 0–0.04)
IMM GRANULOCYTES NFR BLD AUTO: 0.4 % (ref 0–0.5)
KETONES UR QL STRIP: NEGATIVE
LEUKOCYTE ESTERASE UR QL STRIP: NEGATIVE
LIPASE SERPL-CCNC: 11 U/L (ref 4–60)
LYMPHOCYTES # BLD AUTO: 2.5 K/UL (ref 1–4.8)
LYMPHOCYTES NFR BLD: 30 % (ref 18–48)
MCH RBC QN AUTO: 30.1 PG (ref 27–31)
MCHC RBC AUTO-ENTMCNC: 33.4 G/DL (ref 32–36)
MCV RBC AUTO: 90 FL (ref 82–98)
MONOCYTES # BLD AUTO: 0.8 K/UL (ref 0.3–1)
MONOCYTES NFR BLD: 9.9 % (ref 4–15)
NEUTROPHILS # BLD AUTO: 4.4 K/UL (ref 1.8–7.7)
NEUTROPHILS NFR BLD: 53 % (ref 38–73)
NITRITE UR QL STRIP: NEGATIVE
NRBC BLD-RTO: 0 /100 WBC
PH UR STRIP: 7 [PH] (ref 5–8)
PLATELET # BLD AUTO: 312 K/UL (ref 150–450)
PMV BLD AUTO: 9.7 FL (ref 9.2–12.9)
POTASSIUM SERPL-SCNC: 4 MMOL/L (ref 3.5–5.1)
PROT SERPL-MCNC: 7.4 G/DL (ref 6–8.4)
PROT UR QL STRIP: ABNORMAL
RBC # BLD AUTO: 4.49 M/UL (ref 4.6–6.2)
SODIUM SERPL-SCNC: 139 MMOL/L (ref 136–145)
SP GR UR STRIP: >1.03 (ref 1–1.03)
URN SPEC COLLECT METH UR: ABNORMAL
UROBILINOGEN UR STRIP-ACNC: ABNORMAL EU/DL
WBC # BLD AUTO: 8.31 K/UL (ref 3.9–12.7)

## 2022-04-29 PROCEDURE — 99285 EMERGENCY DEPT VISIT HI MDM: CPT | Mod: 25

## 2022-04-29 PROCEDURE — 25500020 PHARM REV CODE 255: Performed by: EMERGENCY MEDICINE

## 2022-04-29 PROCEDURE — 81003 URINALYSIS AUTO W/O SCOPE: CPT | Performed by: EMERGENCY MEDICINE

## 2022-04-29 PROCEDURE — 83690 ASSAY OF LIPASE: CPT | Performed by: EMERGENCY MEDICINE

## 2022-04-29 PROCEDURE — 85025 COMPLETE CBC W/AUTO DIFF WBC: CPT | Performed by: EMERGENCY MEDICINE

## 2022-04-29 PROCEDURE — 80053 COMPREHEN METABOLIC PANEL: CPT | Performed by: EMERGENCY MEDICINE

## 2022-04-29 RX ORDER — KETOROLAC TROMETHAMINE 30 MG/ML
15 INJECTION, SOLUTION INTRAMUSCULAR; INTRAVENOUS
Status: DISCONTINUED | OUTPATIENT
Start: 2022-04-29 | End: 2022-04-29 | Stop reason: HOSPADM

## 2022-04-29 RX ORDER — SODIUM CHLORIDE 9 MG/ML
1000 INJECTION, SOLUTION INTRAVENOUS
Status: DISCONTINUED | OUTPATIENT
Start: 2022-04-29 | End: 2022-04-29 | Stop reason: HOSPADM

## 2022-04-29 RX ADMIN — IOHEXOL 75 ML: 350 INJECTION, SOLUTION INTRAVENOUS at 11:04

## 2022-04-29 NOTE — Clinical Note
"Jensen Banegas" Baldo was seen and treated in our emergency department on 4/29/2022.  He may return to work on 05/02/2022.       If you have any questions or concerns, please don't hesitate to call.      Carol Ann Rodrigues MD"

## 2022-04-29 NOTE — DISCHARGE INSTRUCTIONS
You were seen in the ER for abdominal pain and blood in your stools. Your bloodwork was normal. Your CT scan has not yet resulted. Please use the medication prescribed on your visit yesterday. You may continue to have a small amount of bleeding after bowel movements until your fissure heals. Return to the ER for severe pain, heavy bleeding, or any new or worsening symptoms.

## 2022-04-29 NOTE — ED PROVIDER NOTES
Encounter Date: 4/29/2022       History     Chief Complaint   Patient presents with    Rectal Bleeding     Patient reports rectal bleeding x 2 days. Patient states that he had bright red bleeding while making bowel movements x 2 episodes. Patient also reports intermittent nausea. Denies diarrhea, fever, chills, vomiting. Patient reports right upper abdominal pain in triage.      26M h/o schizophrenia, asthma p/w rectal bleeding after BMs x 2. First episode was yesterday, passed a hard BM and then felt rectal pain, noticed red blood on the toilet paper when wiping. He was seen in this ER and diagnosed w rectal fissure, prescribed Miralax which he has not yet started taking. This morning he had another BM and again noticed blood on toilet tissue so presents again for evaluation. At this time he reports diffuse abd pain which he states was not present yesterday, as well as persistent rectal pain. Also reports nausea (no vomiting) and feeling weak. No fever or shaking chills, no vomiting, no urinary symptoms. No history of similar. No bloodthinners, no other easy bleeding or bruising.         Review of patient's allergies indicates:   Allergen Reactions    Peanut Anaphylaxis     Past Medical History:   Diagnosis Date    Asthma     Eczema     Schizo affective schizophrenia      Past Surgical History:   Procedure Laterality Date    colon surgery       Family History   Problem Relation Age of Onset    Kidney disease Father     Hypertension Father      Social History     Tobacco Use    Smoking status: Current Every Day Smoker     Packs/day: 0.50     Types: Cigarettes    Smokeless tobacco: Never Used   Substance Use Topics    Alcohol use: Yes    Drug use: Not Currently     Types: Marijuana     Review of Systems   Constitutional: Negative for chills and fever.   Respiratory: Negative for cough and shortness of breath.    Cardiovascular: Negative for chest pain and palpitations.   Gastrointestinal: Positive for  abdominal pain, blood in stool and nausea. Negative for vomiting.   Genitourinary: Negative for difficulty urinating and dysuria.   Musculoskeletal: Negative for back pain.   Neurological: Positive for weakness. Negative for headaches.   All other systems reviewed and are negative.      Physical Exam     Initial Vitals [04/29/22 0937]   BP Pulse Resp Temp SpO2   126/66 108 18 98.3 °F (36.8 °C) 98 %      MAP       --         Physical Exam    Vitals reviewed.  Constitutional: He appears well-developed and well-nourished.   HENT:   Head: Normocephalic.   Eyes: EOM are normal.   Neck:   Normal range of motion.  Cardiovascular:   Borderline tachycardic, regular   Pulmonary/Chest: Breath sounds normal. No respiratory distress.   Abdominal: Abdomen is soft.   Diffuse TTP, no rebound or guarding   Genitourinary:    Genitourinary Comments: Small fissure at 6 o'clock  No hemorrhoids  No active external bleeding  Pt declines digital rectal exam d/t pain     Musculoskeletal:         General: Normal range of motion.      Cervical back: Normal range of motion.     Neurological: He is alert and oriented to person, place, and time.   Skin: Skin is warm and dry.   Psychiatric: He has a normal mood and affect.         ED Course   Procedures  Labs Reviewed   CBC W/ AUTO DIFFERENTIAL - Abnormal; Notable for the following components:       Result Value    RBC 4.49 (*)     Hemoglobin 13.5 (*)     All other components within normal limits   COMPREHENSIVE METABOLIC PANEL - Abnormal; Notable for the following components:    CO2 22 (*)     All other components within normal limits   URINALYSIS, REFLEX TO URINE CULTURE - Abnormal; Notable for the following components:    Specific Gravity, UA >1.030 (*)     Protein, UA Trace (*)     Urobilinogen, UA 2.0-3.0 (*)     All other components within normal limits    Narrative:     Specimen Source->Urine   LIPASE          Imaging Results          CT Abdomen Pelvis With Contrast (Final result)  Result  time 04/29/22 13:15:11    Final result by RADIOLOGISTRACHEL (04/29/22 13:15:11)                 Impression:      1. Slightly enlarged bilateral inguinal lymph nodes, most likely reactive however of unclear etiology. 2. Left lung base findings favor either atelectasis or very early atypical pneumonia. 3. No other acute abdominopelvic pathology identified.      Electronically signed by: Rachel Radiologist  Date:    04/29/2022  Time:    13:15             Narrative:    EXAMINATION:  CT Abdomen And Pelvis With Contrast    CLINICAL HISTORY:  26 years old Clinical indication: Abdominal pain    TECHNIQUE:  Imaging protocol: Computed tomography of the abdomen and pelvis with contrast. Radiation optimization: All CT scans at this facility use at least one of these dose optimization techniques: automated exposure control; mA and/or kV adjustment per patient size (includes targeted exams where dose is matched to clinical indication); or iterative reconstruction. Contrast material: OMNIPAQUE 350; Contrast volume: 75 ml; Contrast route: INTRAVENOUS (IV)    COMPARISON:  No relevant prior studies available.    FINDINGS:  Liver, gallbladder, biliary system, pancreas, spleen, adrenal glands, kidneys, ureters, and urinary bladder are normal. Normal prostate gland and seminal vesicles. No bowel obstruction or significant bowel wall thickening. Mild submucosal fat deposition noted throughout the colon, a chronic finding which is of no clinical concern. Appendix not confidently visualized in this exam, however I do not see any evidence for acute appendicitis. No free fluid, fluid collections, or pneumoperitoneum. No concerning abdominopelvic adenopathy except for slightly prominent bilateral inguinal lymph nodes which are nonspecific. For example, a 1.4 cm left inguinal lymph node is seen on image 155 series 2. As another example, an 8 mm lymph node is seen in the right inguinal region on image 156 series 2. Normal abdominopelvic  vasculature. No acute body wall soft tissue findings. Very subtle ground-glass airspace opacities in the left lung base. Lung bases are otherwise clear. No acute skeletal  abnormality or aggressive osseous lesion.                                 Medications   iohexoL (OMNIPAQUE 350) injection 75 mL (75 mLs Intravenous Given 4/29/22 1128)     Medical Decision Making:   Initial Assessment:   26M presents for re-evaluation of rectal bleeding/pain w wiping after hard BM, had another episode today and now c/o diffuse abd pain, nausea, and generalized weakness. Has not started Miralax as prescribed yest.    Symptoms may be r/t constipation and rectal fissure, however, as this is his 2nd presentation with new/progressing symptoms will undertake workup with labs and abd imaging.     Plan:  -IVNS bolus, IV Toradol  -CBC, CMP, lipase, UA  -CT A/P    12:13pm  Labs unremarkable. Pt feeling much better and states he needs to leave to let a family member in the house. Unfortunately CT reads are delayed today and his result is not yet back. He understands he may have an undiagnosed emergency medical condition. He will leave his phone number, we can call with any abnormal results, he is aware he would need to return immediately to the ER under these circumstances. I feel he understands risks/benefits of this decision.     3:00pm  CT has resulted negative, I called patient to discuss this with him directly, reiterated home care instructions and ER return precautions, he states understanding.     Clinical Impression:   Final diagnoses:  [K62.5] Rectal bleeding (Primary)          ED Disposition Condition    NELL Rodrigues MD  04/29/22 0745

## 2022-04-29 NOTE — ED TRIAGE NOTES
Pt. Complains of rectal bleeding. Pt. States that he only notices blood when he wipes and as far as he knows, he is not actively bleeding. Pt. Also complains of generalized abd pain that comes and goes. Pt. Was seen here on yesterday for the same thing and was told that if he sees blood again to come back to the ER. Pt. Was prescribed stool softer on yesterday.

## 2022-09-26 ENCOUNTER — HOSPITAL ENCOUNTER (EMERGENCY)
Facility: HOSPITAL | Age: 27
Discharge: HOME | End: 2022-09-26
Attending: EMERGENCY MEDICINE
Payer: MEDICAID

## 2022-09-26 ENCOUNTER — TELEPHONE (OUTPATIENT)
Dept: UROLOGY | Facility: CLINIC | Age: 27
End: 2022-09-26
Payer: MEDICAID

## 2022-09-26 VITALS
HEART RATE: 65 BPM | OXYGEN SATURATION: 100 % | SYSTOLIC BLOOD PRESSURE: 137 MMHG | TEMPERATURE: 98 F | RESPIRATION RATE: 16 BRPM | DIASTOLIC BLOOD PRESSURE: 80 MMHG

## 2022-09-26 DIAGNOSIS — N50.819 TESTICLE PAIN: Primary | ICD-10-CM

## 2022-09-26 DIAGNOSIS — N50.89 TESTICULAR MICROLITHIASIS: ICD-10-CM

## 2022-09-26 DIAGNOSIS — N43.3 HYDROCELE, UNSPECIFIED HYDROCELE TYPE: ICD-10-CM

## 2022-09-26 LAB
BACTERIA #/AREA URNS HPF: NORMAL /HPF
BILIRUB UR QL STRIP: NEGATIVE
CAOX CRY URNS QL MICRO: NORMAL
CLARITY UR: ABNORMAL
COLOR UR: YELLOW
GLUCOSE UR QL STRIP: NEGATIVE
HGB UR QL STRIP: ABNORMAL
HYALINE CASTS #/AREA URNS LPF: 0 /LPF
KETONES UR QL STRIP: NEGATIVE
LEUKOCYTE ESTERASE UR QL STRIP: NEGATIVE
MICROSCOPIC COMMENT: NORMAL
NITRITE UR QL STRIP: NEGATIVE
PH UR STRIP: 7 [PH] (ref 5–8)
PROT UR QL STRIP: ABNORMAL
RBC #/AREA URNS HPF: 2 /HPF (ref 0–4)
SP GR UR STRIP: 1.03 (ref 1–1.03)
URN SPEC COLLECT METH UR: ABNORMAL
UROBILINOGEN UR STRIP-ACNC: NEGATIVE EU/DL
WBC #/AREA URNS HPF: 0 /HPF (ref 0–5)

## 2022-09-26 PROCEDURE — 81000 URINALYSIS NONAUTO W/SCOPE: CPT | Performed by: NURSE PRACTITIONER

## 2022-09-26 PROCEDURE — 87491 CHLMYD TRACH DNA AMP PROBE: CPT | Performed by: NURSE PRACTITIONER

## 2022-09-26 PROCEDURE — 99284 EMERGENCY DEPT VISIT MOD MDM: CPT

## 2022-09-26 PROCEDURE — 87591 N.GONORRHOEAE DNA AMP PROB: CPT | Performed by: NURSE PRACTITIONER

## 2022-09-26 NOTE — ED PROVIDER NOTES
"Encounter Date: 9/26/2022    SCRIBE #1 NOTE: I, Danette Rawls, am scribing for, and in the presence of,  Yaya Lopez NP. I have scribed the following portions of the note - Other sections scribed: HPI, ROS.     History     Chief Complaint   Patient presents with    Testicle Pain     Intermittent testicular pain for over one year, denies pain with urination     A 26 y.o. male with a pertinent PMHx of asthma and schizophrenia, presents to the ED for evaluation of intermittent bilateral testicular pain that began over a year ago. Patient reports that it feels like someone is squeezing his testicles and it is worse on the left side. He states that he came to this facility with the same symptoms before, but doesn't "remember what happened". No other exacerbating or alleviating factors. Patient denies dysuria, penile discharge, or any other associated symptoms.      The history is provided by the patient. No  was used.   Review of patient's allergies indicates:   Allergen Reactions    Peanut Anaphylaxis     Past Medical History:   Diagnosis Date    Asthma     Eczema     Schizo affective schizophrenia      Past Surgical History:   Procedure Laterality Date    colon surgery       Family History   Problem Relation Age of Onset    Kidney disease Father     Hypertension Father      Social History     Tobacco Use    Smoking status: Every Day     Packs/day: 0.50     Types: Cigarettes    Smokeless tobacco: Never   Substance Use Topics    Alcohol use: Yes    Drug use: Not Currently     Types: Marijuana     Review of Systems   Constitutional:  Negative for fever.   HENT:  Negative for sore throat.    Eyes:  Negative for visual disturbance.   Respiratory:  Negative for shortness of breath.    Cardiovascular:  Negative for chest pain.   Gastrointestinal:  Negative for abdominal pain.   Genitourinary:  Positive for testicular pain (intermittent, squeezing). Negative for dysuria and penile discharge. "   Musculoskeletal:  Negative for back pain.   Skin:  Negative for rash.   Neurological:  Negative for headaches.     Physical Exam     Initial Vitals [09/26/22 1136]   BP Pulse Resp Temp SpO2   132/71 79 18 98.3 °F (36.8 °C) 100 %      MAP       --         Physical Exam    Nursing note and vitals reviewed.  Constitutional: He appears well-developed and well-nourished. He is not diaphoretic. No distress.   HENT:   Head: Normocephalic and atraumatic.   Right Ear: External ear normal.   Left Ear: External ear normal.   Nose: Nose normal.   Eyes: EOM are normal. Right eye exhibits no discharge. Left eye exhibits no discharge.   Neck: Neck supple. No tracheal deviation present.   Normal range of motion.  Cardiovascular:  Normal rate.           Pulmonary/Chest: No stridor. No respiratory distress.   Abdominal: Abdomen is soft. He exhibits no distension. There is no abdominal tenderness.   Musculoskeletal:         General: No tenderness. Normal range of motion.      Cervical back: Normal range of motion and neck supple.     Neurological: He is alert and oriented to person, place, and time. He has normal strength. No cranial nerve deficit.   Skin: Skin is warm and dry.   Psychiatric: He has a normal mood and affect. His behavior is normal. Judgment and thought content normal.       ED Course   Procedures  Labs Reviewed   URINALYSIS, REFLEX TO URINE CULTURE - Abnormal; Notable for the following components:       Result Value    Appearance, UA Hazy (*)     Protein, UA 1+ (*)     Occult Blood UA Trace (*)     All other components within normal limits    Narrative:     Specimen Source->Urine   C. TRACHOMATIS/N. GONORRHOEAE BY AMP DNA   URINALYSIS MICROSCOPIC    Narrative:     Specimen Source->Urine          Imaging Results              US Scrotum And Testicles (Final result)  Result time 09/26/22 13:57:49      Final result by Keanu Gotti MD (09/26/22 13:57:49)                   Impression:      1. No evidence of testicular  torsion or orchitis.  2. Small right scrotal hydrocele.  3. Left testicular nonspecific microlithiasis.  Clinical correlation advised.  Further evaluation/follow-up as warranted.      Electronically signed by: Keanu Gotti MD  Date:    09/26/2022  Time:    13:57               Narrative:    EXAMINATION:  US SCROTUM AND TESTICLES    CLINICAL HISTORY:  Testicular pain, unspecified    TECHNIQUE:  Sonography of the scrotum and testes.    COMPARISON:  CT abdomen and pelvis 04/29/2022    FINDINGS:  Right Testicle:    *Size: 4.6 x 2.5 x 2.5 cm  *Appearance: Normal.  *Flow: Normal arterial and venous flow  *Epididymis: Normal.  *Hydrocele: Small.  *Varicocele: None.  .    Left Testicle:    *Size: 4.9 x 2 x 2.6 cm  *Appearance: Several (less than 10) scattered small echogenic foci measuring up to 4 mm throughout the testicle suggesting microlithiasis.  Otherwise normal parenchyma.  *Flow: Normal arterial and venous flow  *Epididymis: Normal.  *Hydrocele: None.  *Varicocele: None.  .    Other findings: None.                                       Medications - No data to display  Medical Decision Making:   History:   Old Medical Records: I decided to obtain old medical records.  Clinical Tests:   Lab Tests: Ordered and Reviewed  Radiological Study: Ordered and Reviewed  ED Management:  HPI and physical exam as above.  Urinalysis without evidence of infection.  Gonorrhea chlamydia testing in process.  Ultrasound without evidence of torsion, epididymitis, or other emergent pathology.  There is left testicular microlithiasis.  Condition and implications discussed with patient.  Advised him to follow-up with urology.  I placed an ambulatory referral to urology.  ED return precautions given.  Patient expressed understanding.        Scribe Attestation:   Scribe #1: I performed the above scribed service and the documentation accurately describes the services I performed. I attest to the accuracy of the note.                   Clinical  Impression:   Final diagnoses:  [N50.819] Testicle pain (Primary)  [N50.89] Testicular microlithiasis  [N43.3] Hydrocele, unspecified hydrocele type        ED Disposition Condition    Discharge Stable          ED Prescriptions    None       Follow-up Information       Follow up With Specialties Details Why Contact Info    Gracie Linda MD Urology Schedule an appointment as soon as possible for a visit in 1 week For further evaluation [address]   [facility] [address]  [phone]      [facility] Emergency Medicine Go to  If symptoms worsen, As needed [address] [facility] [id number] [phone]        I, Yaya Lopez NP, personally performed the services described in this documentation. All medical record entries made by the scribe were at my direction and in my presence. I have reviewed the chart and agree that the record reflects my personal performance and is accurate and complete.      Yaya Lopez NP  09/26/22 8044

## 2022-09-26 NOTE — DISCHARGE INSTRUCTIONS
Follow-up with urology.    Thank you for coming to our Emergency Department today. It is important to remember that some problems are difficult to diagnose and may not be found during your first visit. Be sure to follow up with your primary care doctor.  If you do not have one, you may contact the one listed on your discharge paperwork or you may also call the [facility] Appointment Desk at [phone] to schedule an appointment with one.     Return to the ER with any questions/concerns, new/concerning symptoms, worsening or failure to improve. Do not drive or make any important decisions for 24 hours if you have received any pain medications, sedatives or mood altering drugs during your ER visit.

## 2022-09-26 NOTE — TELEPHONE ENCOUNTER
I spoke with the pt and let him know that per [facility] policy, his PCP would have to be [facility] to be seen as a new pt. I advised the pt to call his PCP and see if their was another urologist they could refer him to. The pt verbalized understanding.

## 2022-09-26 NOTE — Clinical Note
"Jensen Bland (Deonte) was seen and treated in our emergency department on 9/26/2022.  He may return to work on 09/27/2022.       If you have any questions or concerns, please don't hesitate to call.      Yaya Lopez NP"

## 2022-09-26 NOTE — FIRST PROVIDER EVALUATION
Emergency Department TeleTriage Encounter Note      CHIEF COMPLAINT    Chief Complaint   Patient presents with    Testicle Pain     Intermittent testicular pain for over one year, denies pain with urination       VITAL SIGNS   Initial Vitals [09/26/22 1136]   BP Pulse Resp Temp SpO2   132/71 79 18 98.3 °F (36.8 °C) 100 %      MAP       --            ALLERGIES    Review of patient's allergies indicates:   Allergen Reactions    Peanut Anaphylaxis       PROVIDER TRIAGE NOTE  This is a teletriage evaluation of a 26 y.o. male presenting to the ED with c/o intermittent testicle pain, x 1 year, most recent episode began 3 days ago. Limited physical exam via telehealth: The patient is awake, alert, answering questions appropriately and is not in respiratory distress. Initial orders will be placed and care will be transferred to an alternate provider when patient is roomed for a full evaluation. Any additional orders and the final disposition will be determined by that provider.         ORDERS  Labs Reviewed   C. TRACHOMATIS/N. GONORRHOEAE BY AMP DNA   URINALYSIS, REFLEX TO URINE CULTURE       ED Orders (720h ago, onward)      Start Ordered     Status Ordering Provider    09/26/22 1144 09/26/22 1143  US Scrotum And Testicles  1 time imaging         Ordered KENDRA HARRIS    09/26/22 1144 09/26/22 1143  Urinalysis, Reflex to Urine Culture Urine, Clean Catch  STAT         Ordered KENDRA HARRIS    09/26/22 1144 09/26/22 1143  C. trachomatis/N. gonorrhoeae by AMP DNA [facility]; Urine  STAT         Ordered KENDRA HARRIS              Virtual Visit Note: The provider triage portion of this emergency department evaluation and documentation was performed via [employer], a HIPAA-compliant telemedicine application, in concert with a tele-presenter in the room. A face to face patient evaluation with one of my colleagues will occur once the patient is placed in an emergency department room.      DISCLAIMER: This note was  prepared with [employer] voice recognition transcription software. Garbled syntax, mangled pronouns, and other bizarre constructions may be attributed to that software system.

## 2022-09-26 NOTE — ED NOTES
Pt c/o bilat testicle pain on/off x 1 year.  Denies dysuria or difficulty voiding.  + swelling, denies redness or fever.   Denies abd pain or N/V.  Pt is AAOx3, resp even and unlabored, skin warm and dry.  NAD noted.

## 2022-09-28 LAB
C TRACH DNA SPEC QL NAA+PROBE: NOT DETECTED
N GONORRHOEA DNA SPEC QL NAA+PROBE: NOT DETECTED

## 2022-12-01 ENCOUNTER — HOSPITAL ENCOUNTER (EMERGENCY)
Facility: HOSPITAL | Age: 27
Discharge: HOME | End: 2022-12-01
Attending: EMERGENCY MEDICINE
Payer: MEDICAID

## 2022-12-01 VITALS
SYSTOLIC BLOOD PRESSURE: 142 MMHG | WEIGHT: 143 LBS | TEMPERATURE: 99 F | BODY MASS INDEX: 22.98 KG/M2 | RESPIRATION RATE: 16 BRPM | HEIGHT: 66 IN | DIASTOLIC BLOOD PRESSURE: 76 MMHG | HEART RATE: 69 BPM | OXYGEN SATURATION: 100 %

## 2022-12-01 DIAGNOSIS — R74.8 ELEVATED CPK: ICD-10-CM

## 2022-12-01 DIAGNOSIS — G89.29 CHRONIC BILATERAL THORACIC BACK PAIN: Primary | ICD-10-CM

## 2022-12-01 DIAGNOSIS — M54.6 CHRONIC BILATERAL THORACIC BACK PAIN: Primary | ICD-10-CM

## 2022-12-01 LAB
ALBUMIN SERPL BCP-MCNC: 4.5 G/DL (ref 3.5–5.2)
ALP SERPL-CCNC: 74 U/L (ref 55–135)
ALT SERPL W/O P-5'-P-CCNC: 23 U/L (ref 10–44)
ANION GAP SERPL CALC-SCNC: 8 MMOL/L (ref 8–16)
AST SERPL-CCNC: 29 U/L (ref 10–40)
BASOPHILS # BLD AUTO: 0.08 K/UL (ref 0–0.2)
BASOPHILS NFR BLD: 0.8 % (ref 0–1.9)
BILIRUB SERPL-MCNC: 0.4 MG/DL (ref 0.1–1)
BUN SERPL-MCNC: 11 MG/DL (ref 6–20)
CALCIUM SERPL-MCNC: 9.5 MG/DL (ref 8.7–10.5)
CHLORIDE SERPL-SCNC: 106 MMOL/L (ref 95–110)
CK SERPL-CCNC: 927 U/L (ref 20–200)
CO2 SERPL-SCNC: 24 MMOL/L (ref 23–29)
CREAT SERPL-MCNC: 1 MG/DL (ref 0.5–1.4)
DIFFERENTIAL METHOD: NORMAL
EOSINOPHIL # BLD AUTO: 0.3 K/UL (ref 0–0.5)
EOSINOPHIL NFR BLD: 2.4 % (ref 0–8)
ERYTHROCYTE [DISTWIDTH] IN BLOOD BY AUTOMATED COUNT: 13.5 % (ref 11.5–14.5)
EST. GFR  (NO RACE VARIABLE): >60 ML/MIN/1.73 M^2
GLUCOSE SERPL-MCNC: 89 MG/DL (ref 70–110)
HCT VFR BLD AUTO: 42.9 % (ref 40–54)
HGB BLD-MCNC: 14.8 G/DL (ref 14–18)
IMM GRANULOCYTES # BLD AUTO: 0.03 K/UL (ref 0–0.04)
IMM GRANULOCYTES NFR BLD AUTO: 0.3 % (ref 0–0.5)
LYMPHOCYTES # BLD AUTO: 2.8 K/UL (ref 1–4.8)
LYMPHOCYTES NFR BLD: 26.5 % (ref 18–48)
MCH RBC QN AUTO: 30.5 PG (ref 27–31)
MCHC RBC AUTO-ENTMCNC: 34.5 G/DL (ref 32–36)
MCV RBC AUTO: 89 FL (ref 82–98)
MONOCYTES # BLD AUTO: 0.8 K/UL (ref 0.3–1)
MONOCYTES NFR BLD: 7.2 % (ref 4–15)
NEUTROPHILS # BLD AUTO: 6.7 K/UL (ref 1.8–7.7)
NEUTROPHILS NFR BLD: 62.8 % (ref 38–73)
NRBC BLD-RTO: 0 /100 WBC
PLATELET # BLD AUTO: 353 K/UL (ref 150–450)
PMV BLD AUTO: 9.4 FL (ref 9.2–12.9)
POTASSIUM SERPL-SCNC: 3.9 MMOL/L (ref 3.5–5.1)
PROT SERPL-MCNC: 8 G/DL (ref 6–8.4)
RBC # BLD AUTO: 4.85 M/UL (ref 4.6–6.2)
SODIUM SERPL-SCNC: 138 MMOL/L (ref 136–145)
WBC # BLD AUTO: 10.62 K/UL (ref 3.9–12.7)

## 2022-12-01 PROCEDURE — 96374 THER/PROPH/DIAG INJ IV PUSH: CPT

## 2022-12-01 PROCEDURE — 85025 COMPLETE CBC W/AUTO DIFF WBC: CPT | Performed by: NURSE PRACTITIONER

## 2022-12-01 PROCEDURE — 80053 COMPREHEN METABOLIC PANEL: CPT | Performed by: NURSE PRACTITIONER

## 2022-12-01 PROCEDURE — 63600175 PHARM REV CODE 636 W HCPCS: Performed by: NURSE PRACTITIONER

## 2022-12-01 PROCEDURE — 96361 HYDRATE IV INFUSION ADD-ON: CPT

## 2022-12-01 PROCEDURE — 25000003 PHARM REV CODE 250

## 2022-12-01 PROCEDURE — 82550 ASSAY OF CK (CPK): CPT | Performed by: NURSE PRACTITIONER

## 2022-12-01 PROCEDURE — 99285 EMERGENCY DEPT VISIT HI MDM: CPT | Mod: 25

## 2022-12-01 PROCEDURE — 63600175 PHARM REV CODE 636 W HCPCS

## 2022-12-01 RX ORDER — MORPHINE SULFATE 4 MG/ML
6 INJECTION, SOLUTION INTRAMUSCULAR; INTRAVENOUS
Status: COMPLETED | OUTPATIENT
Start: 2022-12-01 | End: 2022-12-01

## 2022-12-01 RX ORDER — ACETAMINOPHEN 500 MG
500 TABLET ORAL EVERY 6 HOURS PRN
Qty: 20 TABLET | Refills: 0 | Status: SHIPPED | OUTPATIENT
Start: 2022-12-01

## 2022-12-01 RX ORDER — METHOCARBAMOL 500 MG/1
1000 TABLET, FILM COATED ORAL EVERY 8 HOURS PRN
Qty: 18 TABLET | Refills: 0 | Status: SHIPPED | OUTPATIENT
Start: 2022-12-01 | End: 2022-12-04

## 2022-12-01 RX ORDER — LIDOCAINE 50 MG/G
1 PATCH TOPICAL DAILY
Qty: 15 PATCH | Refills: 0 | Status: SHIPPED | OUTPATIENT
Start: 2022-12-01

## 2022-12-01 RX ORDER — LIDOCAINE 50 MG/G
1 PATCH TOPICAL
Status: DISCONTINUED | OUTPATIENT
Start: 2022-12-01 | End: 2022-12-01 | Stop reason: HOSPADM

## 2022-12-01 RX ADMIN — SODIUM CHLORIDE, SODIUM LACTATE, POTASSIUM CHLORIDE, AND CALCIUM CHLORIDE 1000 ML: .6; .31; .03; .02 INJECTION, SOLUTION INTRAVENOUS at 02:12

## 2022-12-01 RX ADMIN — MORPHINE SULFATE 6 MG: 4 INJECTION INTRAVENOUS at 03:12

## 2022-12-01 RX ADMIN — LIDOCAINE 1 PATCH: 50 PATCH CUTANEOUS at 03:12

## 2022-12-01 NOTE — ED PROVIDER NOTES
Encounter Date: 12/1/2022       History     Chief Complaint   Patient presents with    Back Pain     28 yo male to triage for upper mid back pain for a few months. Pt says the pain is getting worse and at times have a numbness to the area. Denies injury/trauma, VSS, NAD, AAOx4     CC: Upper back pain    HPI: Jensen Bland, a 27 y.o. male presents to the ED with complaints of upper and mid back pain that has progressively worsened over the past year. Pt also reports intermittent numbness to localized left scapular area.  He reports no radiation of the numbness to arms or legs.  He reports no unilateral numbness, weakness, or tingling to extremities, no slurred speech, difficulty walking or talking. He does work at a gym and frequently lifting weights and was recently evaluated yesterday at PCP, where xrays were obtained but not resulted. Denies any urinary or fecal incontinence.  Does have a history schizophrenia/bipolar - no recent exacerbations of this chronic condition.  He is attempted treatment with ibuprofen and Lidoderm patches without relief.    Patient Active Problem List:     SOB (shortness of breath)     Schizoaffective disorder, bipolar type     Mild intermittent asthma without complication     Elevated BP without diagnosis of hypertension     Seasonal allergic rhinitis     Arm laceration, left, initial encounter          Review of patient's allergies indicates:   Allergen Reactions    Peanut Anaphylaxis     Past Medical History:   Diagnosis Date    Asthma     Eczema     Schizo affective schizophrenia      Past Surgical History:   Procedure Laterality Date    colon surgery       Family History   Problem Relation Age of Onset    Kidney disease Father     Hypertension Father      Social History     Tobacco Use    Smoking status: Every Day     Packs/day: 0.50     Types: Cigarettes    Smokeless tobacco: Never   Substance Use Topics    Alcohol use: Yes    Drug use: Not Currently     Types: Marijuana      Review of Systems   Constitutional:  Negative for appetite change, chills and fever.   HENT:  Negative for sore throat and trouble swallowing.    Respiratory:  Negative for apnea, cough and chest tightness.    Cardiovascular:  Negative for chest pain and leg swelling.   Gastrointestinal:  Negative for diarrhea, nausea and vomiting.   Genitourinary:  Negative for difficulty urinating.   Musculoskeletal:  Positive for back pain. Negative for gait problem, neck pain and neck stiffness.   Skin:  Negative for color change, rash and wound.   Neurological:  Positive for numbness (Intermittent numbness to L scapular area). Negative for dizziness, weakness and headaches.   Psychiatric/Behavioral:  Negative for confusion. The patient is not nervous/anxious.      Physical Exam     Initial Vitals [12/01/22 1228]   BP Pulse Resp Temp SpO2   (!) 137/91 92 17 98 °F (36.7 °C) 99 %      MAP       --         Physical Exam    Nursing note and vitals reviewed.  Constitutional: Vital signs are normal. He appears well-developed and well-nourished. He is not diaphoretic. He is cooperative.  Non-toxic appearance. He does not have a sickly appearance. He does not appear ill. No distress.   HENT:   Head: Normocephalic and atraumatic.   Right Ear: External ear normal.   Left Ear: External ear normal.   Nose: Nose normal.   Mouth/Throat: Oropharynx is clear and moist and mucous membranes are normal. No trismus in the jaw.   Eyes: Conjunctivae and EOM are normal. No scleral icterus.   Neck: Phonation normal. There are no signs of injury.   Normal range of motion.  Cardiovascular:  Normal rate, regular rhythm, normal heart sounds and intact distal pulses.           Pulses:       Radial pulses are 2+ on the right side and 2+ on the left side.   Pulmonary/Chest: Effort normal and breath sounds normal. No apnea, no tachypnea and no bradypnea. No respiratory distress. He has no wheezes. He has no rhonchi. He has no rales.   Abdominal: Abdomen is  soft and flat. He exhibits no distension. There is no abdominal tenderness. There is no rebound and no guarding.   Musculoskeletal:         General: Normal range of motion.      Right shoulder: Normal. No tenderness or bony tenderness. Normal range of motion. Normal strength.      Left shoulder: Normal. No tenderness or bony tenderness. Normal range of motion. Normal strength.      Right hand: Normal pulse.      Left hand: Normal pulse.      Cervical back: Normal range of motion. Tenderness (To generalized back area) present. No swelling, edema, deformity, signs of trauma or rigidity. Spinous process tenderness and muscular tenderness (Pt tender to generalized back area) present. Normal range of motion.      Thoracic back: Tenderness present. No swelling, edema, deformity, signs of trauma or bony tenderness. Normal range of motion.      Lumbar back: No swelling, edema, deformity, signs of trauma, tenderness or bony tenderness. Normal range of motion.        Back:      Neurological: He is alert and oriented to person, place, and time. He has normal strength. No sensory deficit. He exhibits normal muscle tone. Coordination and gait normal. GCS score is 15. GCS eye subscore is 4. GCS verbal subscore is 5. GCS motor subscore is 6.   Skin: Skin is warm and dry. Capillary refill takes less than 2 seconds. No bruising and no rash noted. No erythema.   Psychiatric: He has a normal mood and affect. His speech is normal and behavior is normal. Judgment and thought content normal.     ED Course   Procedures  Labs Reviewed   CK - Abnormal; Notable for the following components:       Result Value     (*)     All other components within normal limits   COMPREHENSIVE METABOLIC PANEL   CBC W/ AUTO DIFFERENTIAL          Imaging Results              CT Cervical Spine Without Contrast (Final result)  Result time 12/01/22 16:01:06      Final result by Hill Oglesby MD (12/01/22 16:01:06)                   Impression:      No  fractures of cervicothoracic spine identified.      Electronically signed by: Hill Oglesby MD  Date:    12/01/2022  Time:    16:01               Narrative:    EXAMINATION:  CT CERVICAL SPINE WITHOUT CONTRAST; CT THORACIC SPINE WITHOUT CONTRAST    CLINICAL HISTORY:  Neck pain, chronic;; Mid-back pain;    TECHNIQUE:  Low dose axial images, sagittal and coronal reformations were performed though the cervical and thoracic spine.  Contrast was not administered.    COMPARISON:  None    FINDINGS:  The occipital condyles, the dens, and C1 are intact.  The vertebral body heights are maintained.  No fractures identified.  Alignment is within normal limits.  No subluxation or perched facets.  No prevertebral soft tissue swelling.  No paraspinal masses or inflammatory changes.  The visualized portions of the lungs are clear.  Uncovertebral spurring at the C3-4 level noted, contributing to moderate right-sided neural foraminal narrowing.  Otherwise, spinal canal and neural foramen are grossly patent throughout the cervicothoracic spine.  Further evaluation could be performed with MRI, if indicated.                                       CT Thoracic Spine Without Contrast (Final result)  Result time 12/01/22 16:01:06      Final result by Hill Oglesby MD (12/01/22 16:01:06)                   Impression:      No fractures of cervicothoracic spine identified.      Electronically signed by: Hill Oglesby MD  Date:    12/01/2022  Time:    16:01               Narrative:    EXAMINATION:  CT CERVICAL SPINE WITHOUT CONTRAST; CT THORACIC SPINE WITHOUT CONTRAST    CLINICAL HISTORY:  Neck pain, chronic;; Mid-back pain;    TECHNIQUE:  Low dose axial images, sagittal and coronal reformations were performed though the cervical and thoracic spine.  Contrast was not administered.    COMPARISON:  None    FINDINGS:  The occipital condyles, the dens, and C1 are intact.  The vertebral body heights are maintained.  No fractures identified.  Alignment  is within normal limits.  No subluxation or perched facets.  No prevertebral soft tissue swelling.  No paraspinal masses or inflammatory changes.  The visualized portions of the lungs are clear.  Uncovertebral spurring at the C3-4 level noted, contributing to moderate right-sided neural foraminal narrowing.  Otherwise, spinal canal and neural foramen are grossly patent throughout the cervicothoracic spine.  Further evaluation could be performed with MRI, if indicated.                                       Medications   LIDOcaine 5 % patch 1 patch (1 patch Transdermal Patch Applied 12/1/22 8857)   lactated ringers bolus 1,000 mL (1,000 mLs Intravenous New Bag 12/1/22 1451)   morphine injection 6 mg (6 mg Intravenous Given 12/1/22 1546)     Medical Decision Making:   History:   Old Medical Records: I decided to obtain old medical records.  Old Records Summarized: records from another hospital.       <> Summary of Records: X-ray completed yesterday from outpatient clinic.  FINDINGS:  Thoracic vertebral body heights are maintained. No compression fracture. Sagittal alignment is normal. Disc spaces are maintained without significant spondylosis. Remaining regional bones and soft tissues appear within normal limits.  Clinical Tests:   Lab Tests: Ordered and Reviewed  Radiological Study: Ordered and Reviewed  ED Management:  2:52 PM This is Alayna Holdsworth dictating. I resumed care for the patient at shift change from Angelo Kearns NP pending CT results and finishing his liter of fluids.  Per nurse patient is asking for pain meds.  Patient given a lidocaine patch and morphine.  CT showed no fractures of cervicothoracic spine identified.  Patient states he is feeling better.  Discussed with patient this is most likely acute on chronic back pain. Instructed patient to rest, ice, heat, and use tylenol as needed for pain.   Patient will be sent home with lidocaine patches, Tylenol, and Robaxin for symptomatic control.   Patient will follow-up with his PCP. Patient agrees with this plan. Discussed with him strict return precautions, he verbalized understanding. Patient is stable for discharge.     Additional MDM:   PERC Rule:   Age is greater than or equal to 50 = 0.0  Heart Rate is greater than or equal to 100 = 0.0  SaO2 on room air < 95% = 0.0  Unilateral leg swelling = 0.0  Hemoptysis = 0.0  Recent surgery or trauma = 0.0  Prior PE or DVT =  0.0  Hormone use = 0.00  PERC Score = 0  APC / Resident Notes:   This is an evaluation of a 27 y.o. male that presents to the Emergency Department for thoracic back pain that has been ongoing for last year.  Intermittent periods of worsening.  Intermittent periods of numbness sensation to the right scapular area. Physical Exam shows a non-toxic, afebrile, and well appearing male.  There is tenderness palpation over the lower cervical back and upper thoracic back overlying the trapezius.  There is no step-off or crepitus of the midline cervical, thoracic, or lumbar spine.  There is no tenderness lower lumbar back.  Breath sounds clear to auscultation.  Heart regular rhythm.  Abdomen is soft nontender.  Well-groomed, appropriately dressed. Vital signs are reassuring. If available, previous records reviewed. RESULTS:  Normal CBC.  Normal CMP.  CPK elevated at 927.  CT of the cervical and thoracic spine    I considered, but at this time, do not suspect electrolyte disturbance, renal failure, pulmonary embolism, pneumothorax, pneumonia, pleural effusion, or significantly elevated CPK requiring admission.  CT pending to rule out acute displaced vertebral fracture or subluxation.    At the time of sign out, CT pending. Care will be signed out to A. Holdsworth, PA pending completion of ED workup, re-evaluation, determination of final disposition. SANDOVAL Nicole, FNP-C 12/01/2022  2:51 PM                     Clinical Impression:   Final diagnoses:  [M54.6, G89.29] Chronic bilateral thoracic back  pain (Primary)  [R74.8] Elevated CPK      ED Disposition Condition    Discharge Stable          ED Prescriptions       Medication Sig Dispense Start Date End Date Auth. Provider    methocarbamoL (ROBAXIN) 500 MG Tab Take 2 tablets (1,000 mg total) by mouth every 8 (eight) hours as needed (Muscle Spasm/Pain). 18 tablet 12/1/2022 12/4/2022 MADINA Lucas    LIDOcaine (LIDODERM) 5 % Place 1 patch onto the skin once daily. Remove & Discard patch within 12 hours then leave off for 12 hours 15 patch 12/1/2022 -- Alayna Holdsworth, PA-C    acetaminophen (TYLENOL) 500 MG tablet Take 1 tablet (500 mg total) by mouth every 6 (six) hours as needed for Temperature greater than or Pain. 20 tablet 12/1/2022 -- Alayna Holdsworth, PA-C          Follow-up Information       Follow up With Specialties Details Why Contact Info    Your Primary Care Doctor  Schedule an appointment as soon as possible for a visit  Please call and schedule an appointment for follow up this week.     [facility]  Schedule an appointment as soon as possible for a visit  For Follow-Up, This Week, If you do not have a Primary Care Doctor [address] [facility] [address]  [phone]      [facility] [place] - Emergency Dept Emergency Medicine Go to  If symptoms worsen [address]  [facility] [id number] [phone]             Alayna Holdsworth, PA-C  12/01/22 6912

## 2022-12-01 NOTE — ED TRIAGE NOTES
Pt presents to the ED with complaints of upper mid back pain  accompanied with numbness for the past 3 months.   Pt reports worsening pain   Pt denies any trauma or injury  Pt is AAOX4

## 2022-12-01 NOTE — DISCHARGE INSTRUCTIONS
§ Please return to the Emergency Department for any new or worsening symptoms including: fever, chest pain, shortness of breath, loss of consciousness, dizziness, weakness, or any other concerns.     § Schedule an appointment for follow up with your Primary Care Doctor as soon as possible for a recheck of your symptoms. If you do not have one, contact the one listed on your discharge paperwork or call the [facility] Appointment Desk at [phone] to schedule an appointment.     § Please take all medication as prescribed.  Please stay well hydrated, drink plenty of fluids.

## 2022-12-06 ENCOUNTER — PATIENT OUTREACH (OUTPATIENT)
Dept: EMERGENCY MEDICINE | Facility: HOSPITAL | Age: 27
End: 2022-12-06
Payer: MEDICAID

## 2022-12-06 NOTE — PROGRESS NOTES
Roger Garcia  ED Navigator  Emergency Department    Project: [facility] [place] ED Navigator  Role: Community Health Worker    Date: 12/06/2022  Patient Name: Jensen Bland  MRN: [id number]  PCP: Desire Newby MD (Inactive)    Assessment:     Jensen Bland is a 27 y.o. male who has presented to ED for back pain. Patient has visited the ED 2 times in the past 3 months. Patient did not contact PCP.     ED Navigator Initial Assessment    ED Navigator Enrollment Documentation  Consent to Services  Does patient consent to completing the assessment?: Yes  Contact  Method of Initial Contact: Phone  Transportation  Does the patient have issues with Transportation?: Yes  Does the patient have transportation to and from healthcare appointments?: No  Can family, friends, or others help?: No  Lack of transportation to appts, pharmacy, etc.?: No  What is available in their region?: Medicaid transporation  Insurance Coverage  Do you have coverage/adequate coverage?: Yes  Type/kind of coverage: Coverage:  Medicaid/[place] [facility]  Is patient able to afford co-pays/deductibles?: Yes  Is patient able to afford HME or supplies?: Yes  Does patient have an established [facility] PCP?: Yes  Able to access?: Yes  Does the patient have a lack of adequate coverage?: No  Specialist Appointment  Did the patient come to the ED to see a specialist?: No  Does the patient have a pending specialist referral?: No  Does the patient have a specialist appointment made?: No  PCP Follow Up Appointment  Has the patient had an appointment with a primary care provider in the past year?: Yes  Approximate date: 11/30/22  Provider: AIMEE Choudhury  Does the patient have a follow up appontment with a PCP?: No  When was the last time you saw your PCP?: 11/30/22  Why does the patient not have a follow up scheduled?: Other (see comments) (Comment: Intends to make one)  Medications  Is patient able to afford medication?: Yes  Is patient unable to get  medication due to lack of transportation?: No  Psychological  Does the patient have psycho-social concerns?: Yes  What concerns does the patient have?: Anxiety and/or Depression  Food  Does the patient have concerns about food?: No  Communication/Education  Does the patient have limited English proficiency/English not primary language?: No  Does patient have low literacy and/or low health literacy?: Yes  Does patient have concerns with care?: No  Does patient have dissatisfaction with care?: No  Other Financial Concerns  Does the patient have immediate financial distress?: No  Does the patient have general financial concerns?: No  Other Social Barriers/Concerns  Does the patient have any additional barriers or concerns?: Work  Primary Barrier  Barriers identified: Cognitive barrier (health literacy, language and communication, etc.)  Root Cause of ED Utilization: Patient Knowledge/Low Health Literacy  Plan to address Patient Knowledge/Low Health Literacy: Provided information for [facility] 24/7 Nurse triage line [phone] or [facility] ([phone])  Next steps: Provided Education  Was education/educational materials provided surrounding PCP services/creating a medical home?: Yes Was education verbal or written?: Verbal     Was education/educational materials provided surrounding low cost, healthy foods?: Yes Was education verbal or written?: Verbal, Written (Comment: Healthy Eating information sent via e-mail)     Was education/educational materials provided surrounding other items? If so, use comment to explain.: No    Plan: Provided information for [facility] 24/7 Nurse triage line, [phone] or [facility] ([phone])  Expected Date of Follow Up 1: 1/17/23  Additional Documentation: ED Navigation called patient for initial enrollment. Patients mom answered the provided number and recommend that ED Navigator call the patient at [phone].ED Navigator spoke with patient regarding  his recent ED visit. Patient stated that he is still uncomfortable, however her is ok. ED Navigator urged patient to schedule a follow-up with his PCP. Patient acknowledge that he intends to schedule a follow-up. Assessment completed. Patient request Medicaid transportation resources. In addition to the request resource, Right Care Right Place form, [place] Virtual Visit Flyer, [facility] PCP scheduling assistance, [facility] on call RN#, and Heart Healthy Diet education were sent via verified e-mail.  Roger Garcia          Social History     Socioeconomic History    Marital status: Single   Tobacco Use    Smoking status: Every Day     Packs/day: 0.50     Types: Cigarettes    Smokeless tobacco: Never   Substance and Sexual Activity    Alcohol use: Yes    Drug use: Not Currently     Types: Marijuana    Sexual activity: Yes     Social Determinants of Health     Financial Resource Strain: Low Risk     Difficulty of Paying Living Expenses: Not hard at all   Food Insecurity: No Food Insecurity    Worried About Running Out of Food in the Last Year: Never true    Ran Out of Food in the Last Year: Never true   Transportation Needs: No Transportation Needs    Lack of Transportation (Medical): No    Lack of Transportation (Non-Medical): No   Physical Activity: Sufficiently Active    Days of Exercise per Week: 5 days    Minutes of Exercise per Session: 30 min   Stress: Stress Concern Present    Feeling of Stress : To some extent   Social Connections: Moderately Integrated    Frequency of Communication with Friends and Family: More than three times a week    Frequency of Social Gatherings with Friends and Family: More than three times a week    Attends Restoration Services: 1 to 4 times per year    Active Member of Clubs or Organizations: Yes    Attends Club or Organization Meetings: Never    Marital Status: Never    Housing Stability: Low Risk     Unable to Pay for Housing in the Last Year: No    Number of Places Lived in the Last  Year: 1    Unstable Housing in the Last Year: No       Plan:   ED Navigation called patient for initial enrollment. Patients mom answered the provided number and recommend that ED Navigator call the patient at [phone].ED Navigator spoke with patient regarding his recent ED visit. Patient stated that he is still uncomfortable, however her is ok. ED Navigator urged patient to schedule a follow-up with his PCP. Patient acknowledge that he intends to schedule a follow-up. Assessment completed. Patient request Medicaid transportation resources. In addition to the request resource, Right Care Right Place form, [place] Virtual Visit Flyer, [facility] P[facility] scheduling assistance, [facility] on call RN#, and Heart Healthy Diet education were sent via verified e-mail.  Roger Garcia       Appointment made with: Desire Newby MD (Inactive)

## 2023-01-05 ENCOUNTER — HOSPITAL ENCOUNTER (EMERGENCY)
Facility: HOSPITAL | Age: 28
Discharge: HOME | End: 2023-01-05
Attending: EMERGENCY MEDICINE
Payer: MEDICAID

## 2023-01-05 VITALS
HEART RATE: 80 BPM | TEMPERATURE: 98 F | WEIGHT: 150 LBS | HEIGHT: 66 IN | BODY MASS INDEX: 24.11 KG/M2 | OXYGEN SATURATION: 99 % | RESPIRATION RATE: 16 BRPM

## 2023-01-05 DIAGNOSIS — S29.012A UPPER BACK STRAIN, INITIAL ENCOUNTER: Primary | ICD-10-CM

## 2023-01-05 PROCEDURE — 63600175 PHARM REV CODE 636 W HCPCS: Performed by: EMERGENCY MEDICINE

## 2023-01-05 PROCEDURE — 25000003 PHARM REV CODE 250: Performed by: EMERGENCY MEDICINE

## 2023-01-05 PROCEDURE — 96372 THER/PROPH/DIAG INJ SC/IM: CPT | Performed by: EMERGENCY MEDICINE

## 2023-01-05 PROCEDURE — 99284 EMERGENCY DEPT VISIT MOD MDM: CPT

## 2023-01-05 RX ORDER — METHOCARBAMOL 500 MG/1
500 TABLET, FILM COATED ORAL 3 TIMES DAILY PRN
Qty: 30 TABLET | Refills: 0 | Status: SHIPPED | OUTPATIENT
Start: 2023-01-05 | End: 2023-01-05 | Stop reason: SDUPTHER

## 2023-01-05 RX ORDER — METHOCARBAMOL 500 MG/1
500 TABLET, FILM COATED ORAL 3 TIMES DAILY PRN
Qty: 30 TABLET | Refills: 0 | Status: SHIPPED | OUTPATIENT
Start: 2023-01-05 | End: 2023-01-15

## 2023-01-05 RX ORDER — METHOCARBAMOL 500 MG/1
500 TABLET, FILM COATED ORAL
Status: COMPLETED | OUTPATIENT
Start: 2023-01-05 | End: 2023-01-05

## 2023-01-05 RX ORDER — KETOROLAC TROMETHAMINE 30 MG/ML
30 INJECTION, SOLUTION INTRAMUSCULAR; INTRAVENOUS
Status: COMPLETED | OUTPATIENT
Start: 2023-01-05 | End: 2023-01-05

## 2023-01-05 RX ADMIN — METHOCARBAMOL 500 MG: 500 TABLET ORAL at 05:01

## 2023-01-05 RX ADMIN — KETOROLAC TROMETHAMINE 30 MG: 30 INJECTION, SOLUTION INTRAMUSCULAR; INTRAVENOUS at 04:01

## 2023-01-05 NOTE — DISCHARGE INSTRUCTIONS
Recommend continue use of ibuprofen as needed for pain control.  Can also take the Robaxin prescribed.  You can also continue using the lidocaine patches.  Follow up with the primary care provider for further management of symptoms.

## 2023-01-05 NOTE — ED PROVIDER NOTES
"Encounter Date: 1/5/2023    SCRIBE #1 NOTE: I, Giovani Hoffman, am scribing for, and in the presence of,  Carl Sarah MD. I have scribed the following portions of the note - Other sections scribed: HPI, ROS, PE.     History     Chief Complaint   Patient presents with    Back Pain     To mid across entire back, on and off for months, this pain started yeserday     Jensen Bland is a 27 y.o male with a PMHx of asthma, that comes to the ED complaining of middle back pain beginning last night. Patient reports complaints of middle back pain worse on the left sided, endorsing his complaints woke him up from sleep last night. Patient describes complaints as "sharp and aching." Patient reports attempting treatment with lidocaine patch and ibuprofen with no immediate relief noted. No other medications taken PTA. No other alleviating or exacerbating factors noted. Denies cough, rhinorrhea, CP, SOB, sore throat, fever, or other associated symptoms. 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 Patient reports he is a current smoker, but denies EtOH or other recreational drug usage.     The history is provided by the patient. No  was used.   Review of patient's allergies indicates:   Allergen Reactions    Peanut Anaphylaxis     Past Medical History:   Diagnosis Date    Asthma     Eczema     Schizo affective schizophrenia      Past Surgical History:   Procedure Laterality Date    colon surgery       Family History   Problem Relation Age of Onset    Kidney disease Father     Hypertension Father      Social History     Tobacco Use    Smoking status: Every Day     Packs/day: 0.50     Types: Cigarettes    Smokeless tobacco: Never   Substance Use Topics    Alcohol use: Yes    Drug use: Not Currently     Types: Marijuana     Review of Systems   Constitutional:  Negative for chills and fever.   HENT:  Negative for congestion.    Respiratory:  Negative for cough and shortness of breath.  "   Cardiovascular:  Negative for chest pain.   Gastrointestinal:  Negative for abdominal pain, diarrhea and nausea.   Genitourinary:  Negative for dysuria.   Musculoskeletal:  Positive for back pain (middle, worse in left side).   Skin:  Negative for rash.   Neurological:  Negative for headaches.     Physical Exam     Initial Vitals [01/05/23 0348]   BP Pulse Resp Temp SpO2   -- 80 16 98.1 °F (36.7 °C) 99 %      MAP       --         Physical Exam    Nursing note and vitals reviewed.  Constitutional: He appears well-developed. He is not diaphoretic. No distress.   HENT:   Head: Normocephalic.   Eyes: EOM are normal.   Cardiovascular:  Normal rate and regular rhythm.           No murmur heard.  Pulmonary/Chest: Effort normal and breath sounds normal. He has no wheezes.   Abdominal: Abdomen is soft. He exhibits no distension. There is no abdominal tenderness.   Musculoskeletal:         General: Normal range of motion.      Comments: No midline spinal tenderness to the C/T/L vertebrae noted. There is tenderness to palpation of the left thoracic paraspinal musculature that is worse with movement of the left shoulder.     Neurological: He is alert.   Skin: Skin is warm. No rash noted.       ED Course   Procedures  Labs Reviewed - No data to display       Imaging Results    None          Medications   ketorolac injection 30 mg (30 mg Intramuscular Given 1/5/23 3776)   methocarbamoL tablet 500 mg (500 mg Oral Given 1/5/23 3770)     Medical Decision Making:   History:   Old Medical Records: I decided to obtain old medical records.  Initial Assessment:     27-year-old male presenting with left upper paraspinal back pain.  Denies any fevers cough dyspnea chest pain.  Reports pain present for multiple hours.  Worse with palpation of the left upper back.  Worse with moving the left upper arm.  Denies any radiation to the arm.  No known health issues.  Patient reports he does work in a gym and regularly lift heavy weights.   Suspect muscle strain.  Discussed using NSAIDs and muscle relaxers as needed for pain control.        Scribe Attestation:   Scribe #1: I performed the above scribed service and the documentation accurately describes the services I performed. I attest to the accuracy of the note.                   Clinical Impression:   Final diagnoses:  [S29.012A] Upper back strain, initial encounter (Primary)        ED Disposition Condition    Discharge Stable          ED Prescriptions       Medication Sig Dispense Start Date End Date Auth. Provider    methocarbamoL (ROBAXIN) 500 MG Tab  (Status: Discontinued) Take 1 tablet (500 mg total) by mouth 3 (three) times daily as needed (back pain). 30 tablet 1/5/2023 1/5/2023 Carl Sarah MD    methocarbamoL (ROBAXIN) 500 MG Tab Take 1 tablet (500 mg total) by mouth 3 (three) times daily as needed (back pain). 30 tablet 1/5/2023 1/15/2023 Carl Sarah MD          Follow-up Information       Follow up With Specialties Details Why Contact Info    Desire Newby MD Pediatrics Schedule an appointment as soon as possible for a visit in 2 days Primary care [address]  [facility]  [place] [address]  [phone]      [facility] [place] [facility] Emergency Medicine  If symptoms worsen [address]  [facility] [address] [phone]            I, Carl Sarah,, personally performed the services described in this documentation. All medical record entries made by the scribe were at my direction and in my presence. I have reviewed the chart and agree that the record reflects my personal performance and is accurate and complete.         Carl Sarah MD  01/05/23 0708

## 2023-01-05 NOTE — ED NOTES
While triaging another pat, pat walks up in the middle of triage and attempts to ask tech for more medication because he is still in pain, I have to ask him twice to leave triage area because it is a hippa violation for him to be there. Will ask MD for more medications.

## 2023-01-26 ENCOUNTER — PATIENT OUTREACH (OUTPATIENT)
Dept: EMERGENCY MEDICINE | Facility: HOSPITAL | Age: 28
End: 2023-01-26

## 2023-04-20 ENCOUNTER — HOSPITAL ENCOUNTER (EMERGENCY)
Facility: HOSPITAL | Age: 28
Discharge: HOME | End: 2023-04-20
Attending: EMERGENCY MEDICINE
Payer: MEDICAID

## 2023-04-20 VITALS
HEIGHT: 66 IN | DIASTOLIC BLOOD PRESSURE: 75 MMHG | OXYGEN SATURATION: 98 % | SYSTOLIC BLOOD PRESSURE: 119 MMHG | WEIGHT: 150 LBS | HEART RATE: 88 BPM | BODY MASS INDEX: 24.11 KG/M2 | TEMPERATURE: 98 F | RESPIRATION RATE: 17 BRPM

## 2023-04-20 DIAGNOSIS — R53.83 FATIGUE: ICD-10-CM

## 2023-04-20 DIAGNOSIS — E86.0 DEHYDRATION: Primary | ICD-10-CM

## 2023-04-20 LAB
ALBUMIN SERPL BCP-MCNC: 4.3 G/DL (ref 3.5–5.2)
ALP SERPL-CCNC: 80 U/L (ref 55–135)
ALT SERPL W/O P-5'-P-CCNC: 23 U/L (ref 10–44)
ANION GAP SERPL CALC-SCNC: 12 MMOL/L (ref 8–16)
AST SERPL-CCNC: 35 U/L (ref 10–40)
BACTERIA #/AREA URNS HPF: ABNORMAL /HPF
BASOPHILS # BLD AUTO: 0.07 K/UL (ref 0–0.2)
BASOPHILS NFR BLD: 0.5 % (ref 0–1.9)
BILIRUB SERPL-MCNC: 0.7 MG/DL (ref 0.1–1)
BILIRUB UR QL STRIP: NEGATIVE
BUN SERPL-MCNC: 19 MG/DL (ref 6–20)
CALCIUM SERPL-MCNC: 9 MG/DL (ref 8.7–10.5)
CHLORIDE SERPL-SCNC: 108 MMOL/L (ref 95–110)
CK SERPL-CCNC: 1055 U/L (ref 20–200)
CK SERPL-CCNC: 1170 U/L (ref 20–200)
CLARITY UR: CLEAR
CO2 SERPL-SCNC: 22 MMOL/L (ref 23–29)
COLOR UR: YELLOW
CREAT SERPL-MCNC: 1.1 MG/DL (ref 0.5–1.4)
DIFFERENTIAL METHOD: ABNORMAL
EOSINOPHIL # BLD AUTO: 0.3 K/UL (ref 0–0.5)
EOSINOPHIL NFR BLD: 2 % (ref 0–8)
ERYTHROCYTE [DISTWIDTH] IN BLOOD BY AUTOMATED COUNT: 13.4 % (ref 11.5–14.5)
EST. GFR  (NO RACE VARIABLE): >60 ML/MIN/1.73 M^2
GLUCOSE SERPL-MCNC: 77 MG/DL (ref 70–110)
GLUCOSE UR QL STRIP: NEGATIVE
HCT VFR BLD AUTO: 40.6 % (ref 40–54)
HGB BLD-MCNC: 13.4 G/DL (ref 14–18)
HGB UR QL STRIP: NEGATIVE
HYALINE CASTS #/AREA URNS LPF: 8 /LPF
IMM GRANULOCYTES # BLD AUTO: 0.08 K/UL (ref 0–0.04)
IMM GRANULOCYTES NFR BLD AUTO: 0.6 % (ref 0–0.5)
KETONES UR QL STRIP: ABNORMAL
LEUKOCYTE ESTERASE UR QL STRIP: NEGATIVE
LYMPHOCYTES # BLD AUTO: 2.6 K/UL (ref 1–4.8)
LYMPHOCYTES NFR BLD: 18.1 % (ref 18–48)
MCH RBC QN AUTO: 29.8 PG (ref 27–31)
MCHC RBC AUTO-ENTMCNC: 33 G/DL (ref 32–36)
MCV RBC AUTO: 90 FL (ref 82–98)
MICROSCOPIC COMMENT: ABNORMAL
MONOCYTES # BLD AUTO: 1.5 K/UL (ref 0.3–1)
MONOCYTES NFR BLD: 10.2 % (ref 4–15)
NEUTROPHILS # BLD AUTO: 9.9 K/UL (ref 1.8–7.7)
NEUTROPHILS NFR BLD: 68.6 % (ref 38–73)
NITRITE UR QL STRIP: NEGATIVE
NRBC BLD-RTO: 0 /100 WBC
PH UR STRIP: 6 [PH] (ref 5–8)
PLATELET # BLD AUTO: 322 K/UL (ref 150–450)
PMV BLD AUTO: 9.7 FL (ref 9.2–12.9)
POCT GLUCOSE: 70 MG/DL (ref 70–110)
POTASSIUM SERPL-SCNC: 4 MMOL/L (ref 3.5–5.1)
PROT SERPL-MCNC: 6.9 G/DL (ref 6–8.4)
PROT UR QL STRIP: ABNORMAL
RBC # BLD AUTO: 4.49 M/UL (ref 4.6–6.2)
RBC #/AREA URNS HPF: 6 /HPF (ref 0–4)
SODIUM SERPL-SCNC: 142 MMOL/L (ref 136–145)
SP GR UR STRIP: >1.03 (ref 1–1.03)
URN SPEC COLLECT METH UR: ABNORMAL
UROBILINOGEN UR STRIP-ACNC: NEGATIVE EU/DL
WBC # BLD AUTO: 14.36 K/UL (ref 3.9–12.7)
WBC #/AREA URNS HPF: 0 /HPF (ref 0–5)

## 2023-04-20 PROCEDURE — 82962 GLUCOSE BLOOD TEST: CPT

## 2023-04-20 PROCEDURE — 81000 URINALYSIS NONAUTO W/SCOPE: CPT | Performed by: EMERGENCY MEDICINE

## 2023-04-20 PROCEDURE — 93005 ELECTROCARDIOGRAM TRACING: CPT

## 2023-04-20 PROCEDURE — 96360 HYDRATION IV INFUSION INIT: CPT

## 2023-04-20 PROCEDURE — 96361 HYDRATE IV INFUSION ADD-ON: CPT

## 2023-04-20 PROCEDURE — 80053 COMPREHEN METABOLIC PANEL: CPT | Performed by: EMERGENCY MEDICINE

## 2023-04-20 PROCEDURE — 82550 ASSAY OF CK (CPK): CPT | Mod: 91

## 2023-04-20 PROCEDURE — 93010 EKG 12-LEAD: ICD-10-PCS | Mod: ,,, | Performed by: INTERNAL MEDICINE

## 2023-04-20 PROCEDURE — 93010 ELECTROCARDIOGRAM REPORT: CPT | Mod: ,,, | Performed by: INTERNAL MEDICINE

## 2023-04-20 PROCEDURE — 25000003 PHARM REV CODE 250

## 2023-04-20 PROCEDURE — 82550 ASSAY OF CK (CPK): CPT | Performed by: EMERGENCY MEDICINE

## 2023-04-20 PROCEDURE — 85025 COMPLETE CBC W/AUTO DIFF WBC: CPT | Performed by: EMERGENCY MEDICINE

## 2023-04-20 PROCEDURE — 99284 EMERGENCY DEPT VISIT MOD MDM: CPT | Mod: 25

## 2023-04-20 RX ADMIN — SODIUM CHLORIDE 1000 ML: 9 INJECTION, SOLUTION INTRAVENOUS at 01:04

## 2023-04-20 RX ADMIN — SODIUM CHLORIDE 1000 ML: 9 INJECTION, SOLUTION INTRAVENOUS at 12:04

## 2023-04-20 NOTE — DISCHARGE INSTRUCTIONS

## 2023-04-20 NOTE — ED PROVIDER NOTES
"Encounter Date: 4/20/2023       History     Chief Complaint   Patient presents with    Fatigue     Pt BIB EMS for fatigue, generalized weakness x2 days. EMS reported Pt had multiple layers of clothing on outside in the heat. EMS reported Pt skin and mucosa was extremely dry. Pt c/o cramping to extremities.      28 y/o M with history of asthma presents today for fatigue and cramping. Pt states that he has not been home for the past 2 days because of an argument with his mother. He has been walking around outside with little to eat or drink. Pt states he began to "see stars" and sat on the ground outside when someone saw him and called EMS. Per triage note with EMS, pt was wearing several layers out in the heat. Pt denies LOC, chest pain, sob, abdominal pain, nausea, vomiting, diarrhea.     The history is provided by the patient. No  was used.   Review of patient's allergies indicates:   Allergen Reactions    Peanut Anaphylaxis     Past Medical History:   Diagnosis Date    Asthma     Eczema     Schizo affective schizophrenia      Past Surgical History:   Procedure Laterality Date    colon surgery       Family History   Problem Relation Age of Onset    Kidney disease Father     Hypertension Father      Social History     Tobacco Use    Smoking status: Every Day     Packs/day: 0.50     Types: Cigarettes    Smokeless tobacco: Never   Substance Use Topics    Alcohol use: Yes    Drug use: Not Currently     Types: Marijuana     Review of Systems   Constitutional:  Positive for fatigue. Negative for fever.   HENT:  Negative for sore throat.    Respiratory:  Negative for shortness of breath.    Cardiovascular:  Negative for chest pain.   Gastrointestinal:  Negative for abdominal pain, blood in stool, diarrhea, nausea and vomiting.   Genitourinary:  Negative for dysuria and hematuria.   Musculoskeletal:  Positive for myalgias. Negative for back pain.   Skin:  Negative for rash.   Neurological:  " Negative for weakness.   Hematological:  Does not bruise/bleed easily.     Physical Exam     Initial Vitals [04/20/23 1121]   BP Pulse Resp Temp SpO2   109/68 110 18 98.3 °F (36.8 °C) 96 %      MAP       --         Physical Exam    Nursing note and vitals reviewed.  Constitutional: He appears well-developed and well-nourished. He is not diaphoretic. No distress.   HENT:   Head: Normocephalic and atraumatic.   Cardiovascular:  Regular rhythm and normal heart sounds.           Pulmonary/Chest: Breath sounds normal.   Abdominal: Abdomen is soft. There is no abdominal tenderness.     Neurological: He is alert.   Skin: Skin is dry. No rash noted.   Psychiatric: He has a normal mood and affect. His behavior is normal. Judgment and thought content normal.       ED Course   Procedures  Labs Reviewed   COMPREHENSIVE METABOLIC PANEL - Abnormal; Notable for the following components:       Result Value    CO2 22 (*)     All other components within normal limits   CBC W/ AUTO DIFFERENTIAL - Abnormal; Notable for the following components:    WBC 14.36 (*)     RBC 4.49 (*)     Hemoglobin 13.4 (*)     Immature Granulocytes 0.6 (*)     Gran # (ANC) 9.9 (*)     Immature Grans (Abs) 0.08 (*)     Mono # 1.5 (*)     All other components within normal limits   URINALYSIS, REFLEX TO URINE CULTURE - Abnormal; Notable for the following components:    Specific Gravity, UA >1.030 (*)     Protein, UA 1+ (*)     Ketones, UA 2+ (*)     All other components within normal limits    Narrative:     Specimen Source->Urine   CK - Abnormal; Notable for the following components:    CPK 1170 (*)     All other components within normal limits   URINALYSIS MICROSCOPIC - Abnormal; Notable for the following components:    RBC, UA 6 (*)     Hyaline Casts, UA 8 (*)     All other components within normal limits    Narrative:     Specimen Source->Urine   CK - Abnormal; Notable for the following components:    CPK 1055 (*)     All other components within normal  "limits   POCT INFLUENZA A/B MOLECULAR   SARS-COV-2 RDRP GENE   POCT GLUCOSE   POCT GLUCOSE MONITORING CONTINUOUS     EKG Readings: (Independently Interpreted)   Sinus tachycardia with rate approx 100 with no st elevation. No pr prolongation, narrow qrs, no prolongation of st interval. No t wave inversion. No obvious changes compared to previous ekg from 2/8/2020.   ECG Results              EKG 12-lead (Final result)  Result time 04/20/23 15:21:22      Final result by Interface, Lab In [facility] (04/20/23 15:21:22)                   Narrative:    Test Reason : R53.83,    Vent. Rate : 101 BPM     Atrial Rate : 101 BPM     P-R Int : 134 ms          QRS Dur : 076 ms      QT Int : 342 ms       P-R-T Axes : 082 082 069 degrees     QTc Int : 443 ms    Sinus tachycardia  Otherwise normal ECG  When compared with ECG of 20-APR-2023 11:51,  No significant change was found  Confirmed by Moe Tracey MD ([id number]) on 4/20/2023 3:21:15 PM    Referred By: [email or web address]   SELF           Confirmed By:Moe Tracey MD                                  Imaging Results    None          Medications   sodium chloride 0.9% bolus 1,000 mL 1,000 mL (0 mLs Intravenous Stopped 4/20/23 1343)   sodium chloride 0.9% bolus 1,000 mL 1,000 mL (0 mLs Intravenous Stopped 4/20/23 1441)     Medical Decision Making:   History:   Old Medical Records: I decided to obtain old medical records.  Initial Assessment:   26 y/o M with history of asthma presents today for fatigue and cramping. Pt states that he has not been home for the past 2 days because of an argument with his mother. He has been walking around outside with little to eat or drink. Pt states he began to "see stars" and sat on the ground outside when someone saw him and called EMS. Per triage note with EMS, pt was wearing several layers out in the heat. He reports occasional marijuana use but denies any other drug use or alcohol. Pt denies LOC, chest pain, sob, abdominal pain, nausea, vomiting, diarrhea. " On exam pt appears comfortable in hospital gown, layers of clothes removed. Mucous membranes dry. Heart and lung sounds normal. No obvious wounds or swelling of the extremities. Pt ambulatory.      Differential Diagnosis:   This is likely acute dehydration with his history of heat exposure, little oral intake, cramping, and elevated cpk.     Rhabdomyolysis considered with cpk of 1170 but is down trending (1055) after 2L fluids.   Infection considered with wbc of 14.36 but he is afebrile, no open wounds seen on exam, no recent illness reported.   Covid and influenza considered but negative testing today.   Clinical Tests:   Lab Tests: Ordered and Reviewed  Medical Tests: Ordered and Reviewed  ED Management:  Repeat cpk after 2L fluids is 1055, decreased from 1170. Pt reports significant improvement after receiving fluids, asking for something to eat. Dinner provided prior to discharge.   Discussed importance of adequate hydration and limiting heat exposure. Also discussed resources for him including shelters but pt reports he will go back home to his mother. Return precautions discussed, including but not limited to, worsening cramps, numbness, swelling of the extremities, LOC.                        Clinical Impression:   Final diagnoses:  [R53.83] Fatigue  [E86.0] Dehydration (Primary)        ED Disposition Condition    Discharge Stable          ED Prescriptions    None       Follow-up Information    None          Little Schaeffer PA-C  04/20/23 9020

## 2023-04-24 ENCOUNTER — PATIENT OUTREACH (OUTPATIENT)
Dept: EMERGENCY MEDICINE | Facility: HOSPITAL | Age: 28
End: 2023-04-24

## 2023-04-24 ENCOUNTER — HOSPITAL ENCOUNTER (EMERGENCY)
Facility: HOSPITAL | Age: 28
Discharge: LEFT BEFORE DISCHARGE | End: 2023-04-25
Attending: STUDENT IN AN ORGANIZED HEALTH CARE EDUCATION/TRAINING PROGRAM
Payer: MEDICAID

## 2023-04-24 VITALS
BODY MASS INDEX: 24.11 KG/M2 | WEIGHT: 150 LBS | RESPIRATION RATE: 18 BRPM | DIASTOLIC BLOOD PRESSURE: 63 MMHG | HEART RATE: 76 BPM | TEMPERATURE: 98 F | OXYGEN SATURATION: 100 % | HEIGHT: 66 IN | SYSTOLIC BLOOD PRESSURE: 122 MMHG

## 2023-04-24 DIAGNOSIS — R51.9 ACUTE NONINTRACTABLE HEADACHE, UNSPECIFIED HEADACHE TYPE: Primary | ICD-10-CM

## 2023-04-24 PROCEDURE — 99282 EMERGENCY DEPT VISIT SF MDM: CPT

## 2023-04-25 PROCEDURE — 25000003 PHARM REV CODE 250: Performed by: PHYSICIAN ASSISTANT

## 2023-04-25 RX ORDER — ACETAMINOPHEN 500 MG
1000 TABLET ORAL
Status: COMPLETED | OUTPATIENT
Start: 2023-04-25 | End: 2023-04-25

## 2023-04-25 RX ADMIN — ACETAMINOPHEN 1000 MG: 500 TABLET, FILM COATED ORAL at 12:04

## 2023-04-25 NOTE — ED TRIAGE NOTES
Pt presents to ER with c/o HA and body pain that he rates a 7/10 at this time. Pt states he fell on 4/20 from dehydration and was brought here by ambulance. Pt states that he took 3 ibuprofen on today. Pt denies any other s/s at this time. Pt requesting asthma med refill.

## 2023-04-25 NOTE — ED PROVIDER NOTES
"Encounter Date: 4/24/2023       History     Chief Complaint   Patient presents with    Headache    Back Pain    Shoulder Pain     Pt presents to ED c/o headache, back pain and left shoulder pain"for a while."  Denies falls, trauma, heavy lifting, any other symptoms.  Pt reports taking IBU around 1500 today with some relief. Pain 7/10.      28yo M smoker presents to ED with chief complaint headache x today.    Began with bitemporal HA this morning. Took 600mg Ibuprofen without relief. Admits to hx similar HAs in the past.  He denies any other associated symptoms.  No lightheadedness, no dizziness, no syncope or near-syncope, no arm or leg weakness, difficulty with ambulation, no slurred speech, no facial droop, no history of CVA, denies any head trauma.  Recently seen for fatigue, found to be dehydrated, mild rhabdo.  Patient denies myalgias.  Denies any joint pain.  No difficulty with ambulation.  Denies leg swelling.  No shortness of breath.  No chest pain.      PMH:  Depression  Asthma   Vitamin-D deficiency  Schizoaffective disorder   History of abnormal LFTs   Chronic bilateral low back pain     Review of patient's allergies indicates:   Allergen Reactions    Peanut Anaphylaxis     Past Medical History:   Diagnosis Date    Asthma     Eczema     Schizo affective schizophrenia      Past Surgical History:   Procedure Laterality Date    colon surgery       Family History   Problem Relation Age of Onset    Kidney disease Father     Hypertension Father      Social History     Tobacco Use    Smoking status: Every Day     Packs/day: 0.50     Types: Cigarettes    Smokeless tobacco: Never   Substance Use Topics    Alcohol use: Not Currently    Drug use: Yes     Types: Marijuana     Review of Systems   Constitutional:  Negative for fatigue.   Respiratory:  Negative for shortness of breath.    Cardiovascular:  Negative for chest pain and leg swelling.   Gastrointestinal:  Negative for diarrhea and vomiting. "   Musculoskeletal:  Negative for arthralgias, gait problem and myalgias.   Neurological:  Positive for headaches. Negative for dizziness, syncope, facial asymmetry, weakness and light-headedness.     Physical Exam     Initial Vitals [04/24/23 2325]   BP Pulse Resp Temp SpO2   122/63 76 18 98.2 °F (36.8 °C) 100 %      MAP       --         Physical Exam    Nursing note and vitals reviewed.  Constitutional: He appears well-developed and well-nourished. He is not diaphoretic. No distress.   HENT:   Head: Normocephalic and atraumatic.   Dry lips, tacky mucous membranes   Eyes: EOM are normal. Pupils are equal, round, and reactive to light.   Fatigable left beating horizontal nystagmus   Neck: Neck supple.   Normal range of motion.  Pulmonary/Chest: No respiratory distress.   Musculoskeletal:         General: No tenderness. Normal range of motion.      Cervical back: Normal range of motion and neck supple.     Neurological: He is alert and oriented to person, place, and time. GCS score is 15. GCS eye subscore is 4. GCS verbal subscore is 5. GCS motor subscore is 6.   Skin: Skin is warm. Capillary refill takes less than 2 seconds.   Psychiatric: He has a normal mood and affect. Thought content normal.       ED Course   Procedures  Labs Reviewed   ISTAT CHEM8          Imaging Results    None          Medications   acetaminophen tablet 1,000 mg (1,000 mg Oral Given 4/25/23 0042)     Medical Decision Making:   Differential Diagnosis:   Headache disorder, dehydration, viral URI  ED Management:  States he is homeless.  He states he did eat earlier today, however sometimes has trouble finding meals.  Will give Tylenol feed the patient, see if there is any improvement of his headache.  I-STAT to ensure no LISA given recent rhabdo.    Unfortunately, patient eloped prior to labs, prior to re-evaluation.                        Clinical Impression:   Final diagnoses:  [R51.9] Acute nonintractable headache, unspecified headache type  (Primary)        ED Disposition Condition    Eloped Stable                Nir Garrett PA-C  04/25/23 0111

## 2023-05-01 ENCOUNTER — PATIENT OUTREACH (OUTPATIENT)
Dept: EMERGENCY MEDICINE | Facility: HOSPITAL | Age: 28
End: 2023-05-01
Payer: MEDICAID

## 2023-12-29 NOTE — PROGRESS NOTES
ED Navigator attempted to complete third follow-up. A gentleman answered and stated wrong number. Patient unable to be reached. Attempted to follow-up on multiple occasions. No further follow-ups scheduled. Encounter closed.  Roger Garcia

## 2024-01-05 ENCOUNTER — HOSPITAL ENCOUNTER (EMERGENCY)
Facility: HOSPITAL | Age: 29
Discharge: HOME | End: 2024-01-05
Attending: EMERGENCY MEDICINE
Payer: MEDICAID

## 2024-01-05 ENCOUNTER — HOSPITAL ENCOUNTER (EMERGENCY)
Facility: HOSPITAL | Age: 29
Discharge: HOME | End: 2024-01-05
Attending: STUDENT IN AN ORGANIZED HEALTH CARE EDUCATION/TRAINING PROGRAM
Payer: MEDICAID

## 2024-01-05 VITALS
DIASTOLIC BLOOD PRESSURE: 82 MMHG | WEIGHT: 150 LBS | SYSTOLIC BLOOD PRESSURE: 141 MMHG | OXYGEN SATURATION: 100 % | RESPIRATION RATE: 18 BRPM | BODY MASS INDEX: 24.21 KG/M2 | TEMPERATURE: 97 F | HEART RATE: 80 BPM

## 2024-01-05 VITALS
HEIGHT: 66 IN | HEART RATE: 74 BPM | TEMPERATURE: 98 F | OXYGEN SATURATION: 98 % | DIASTOLIC BLOOD PRESSURE: 80 MMHG | BODY MASS INDEX: 24.11 KG/M2 | RESPIRATION RATE: 20 BRPM | WEIGHT: 150 LBS | SYSTOLIC BLOOD PRESSURE: 154 MMHG

## 2024-01-05 DIAGNOSIS — R51.9 NONINTRACTABLE HEADACHE, UNSPECIFIED CHRONICITY PATTERN, UNSPECIFIED HEADACHE TYPE: ICD-10-CM

## 2024-01-05 DIAGNOSIS — J06.9 VIRAL URI: Primary | ICD-10-CM

## 2024-01-05 DIAGNOSIS — J06.9 VIRAL URI WITH COUGH: Primary | ICD-10-CM

## 2024-01-05 LAB
CTP QC/QA: YES
MOLECULAR STREP A: NEGATIVE
POC MOLECULAR INFLUENZA A AGN: NEGATIVE
POC MOLECULAR INFLUENZA B AGN: NEGATIVE
SARS-COV-2 RDRP RESP QL NAA+PROBE: NEGATIVE

## 2024-01-05 PROCEDURE — 99282 EMERGENCY DEPT VISIT SF MDM: CPT | Mod: 27

## 2024-01-05 PROCEDURE — 99284 EMERGENCY DEPT VISIT MOD MDM: CPT

## 2024-01-05 PROCEDURE — 63600175 PHARM REV CODE 636 W HCPCS

## 2024-01-05 PROCEDURE — 87651 STREP A DNA AMP PROBE: CPT

## 2024-01-05 PROCEDURE — 87635 SARS-COV-2 COVID-19 AMP PRB: CPT

## 2024-01-05 PROCEDURE — 25000003 PHARM REV CODE 250

## 2024-01-05 PROCEDURE — 25000003 PHARM REV CODE 250: Performed by: NURSE PRACTITIONER

## 2024-01-05 PROCEDURE — 96372 THER/PROPH/DIAG INJ SC/IM: CPT

## 2024-01-05 PROCEDURE — 87502 INFLUENZA DNA AMP PROBE: CPT

## 2024-01-05 RX ORDER — FLUTICASONE PROPIONATE 50 MCG
1 SPRAY, SUSPENSION (ML) NASAL 2 TIMES DAILY PRN
Qty: 15 G | Refills: 0 | Status: SHIPPED | OUTPATIENT
Start: 2024-01-05

## 2024-01-05 RX ORDER — NAPROXEN 500 MG/1
500 TABLET ORAL 2 TIMES DAILY PRN
Qty: 30 TABLET | Refills: 0 | Status: SHIPPED | OUTPATIENT
Start: 2024-01-05

## 2024-01-05 RX ORDER — ACETAMINOPHEN 500 MG
1000 TABLET ORAL
Status: COMPLETED | OUTPATIENT
Start: 2024-01-05 | End: 2024-01-05

## 2024-01-05 RX ORDER — KETOROLAC TROMETHAMINE 30 MG/ML
15 INJECTION, SOLUTION INTRAMUSCULAR; INTRAVENOUS
Status: COMPLETED | OUTPATIENT
Start: 2024-01-05 | End: 2024-01-05

## 2024-01-05 RX ORDER — BENZONATATE 100 MG/1
100 CAPSULE ORAL 3 TIMES DAILY PRN
Qty: 30 CAPSULE | Refills: 0 | Status: SHIPPED | OUTPATIENT
Start: 2024-01-05

## 2024-01-05 RX ORDER — CETIRIZINE HYDROCHLORIDE 10 MG/1
10 TABLET ORAL DAILY PRN
Qty: 30 TABLET | Refills: 0 | Status: SHIPPED | OUTPATIENT
Start: 2024-01-05

## 2024-01-05 RX ORDER — PROMETHAZINE HYDROCHLORIDE AND DEXTROMETHORPHAN HYDROBROMIDE 6.25; 15 MG/5ML; MG/5ML
5 SYRUP ORAL EVERY 4 HOURS PRN
Qty: 180 ML | Refills: 0 | Status: SHIPPED | OUTPATIENT
Start: 2024-01-05

## 2024-01-05 RX ADMIN — ACETAMINOPHEN 1000 MG: 500 TABLET ORAL at 05:01

## 2024-01-05 RX ADMIN — GUAIFENESIN AND DEXTROMETHORPHAN HYDROBROMIDE 1 TABLET: 600; 30 TABLET, EXTENDED RELEASE ORAL at 01:01

## 2024-01-05 RX ADMIN — KETOROLAC TROMETHAMINE 15 MG: 30 INJECTION, SOLUTION INTRAMUSCULAR; INTRAVENOUS at 01:01

## 2024-01-05 NOTE — DISCHARGE INSTRUCTIONS

## 2024-01-05 NOTE — ED TRIAGE NOTES
Patient here for cough, congestion , running nose, and dry cough that started today. Patient denies any other symptom.

## 2024-01-05 NOTE — ED PROVIDER NOTES
Encounter Date: 1/5/2024       History     Chief Complaint   Patient presents with    Cough     Cough congestion for about a day, headache. Denies taking otc meds. Pt reports hand tingling.      28-year-old male with past medical history asthma, eczema, and schizoaffective schizophrenia presents to ED for cough.  Patient states this started today.  No medications prior to arrival.  Patient denies sick contacts.  Patient admits to congestion, runny nose, dry cough, body aches, and headache.  Patient denies fever, sweats, chills, sore throat, trouble breathing, nausea, vomiting, diarrhea, urinary symptoms, dizziness, and lightheadedness.      Review of patient's allergies indicates:   Allergen Reactions    Peanut Anaphylaxis     Past Medical History:   Diagnosis Date    Asthma     Eczema     Schizo affective schizophrenia      Past Surgical History:   Procedure Laterality Date    colon surgery       Family History   Problem Relation Age of Onset    Kidney disease Father     Hypertension Father      Social History     Tobacco Use    Smoking status: Every Day     Current packs/day: 0.50     Types: Cigarettes    Smokeless tobacco: Never   Substance Use Topics    Alcohol use: Not Currently    Drug use: Yes     Types: Marijuana     Review of Systems   Constitutional:  Negative for chills, diaphoresis and fever.   HENT:  Positive for congestion and rhinorrhea. Negative for sore throat.    Respiratory:  Positive for cough. Negative for shortness of breath.    Cardiovascular:  Negative for chest pain.   Gastrointestinal:  Negative for constipation, diarrhea, nausea and vomiting.   Genitourinary:  Negative for decreased urine volume, difficulty urinating, dysuria, frequency and urgency.   Musculoskeletal:  Positive for myalgias (body aches).   Skin:  Negative for rash.   Neurological:  Positive for headaches. Negative for dizziness, weakness and light-headedness.       Physical Exam     Initial Vitals   BP Pulse Resp Temp SpO2    01/05/24 0041 01/05/24 0041 01/05/24 0041 01/05/24 0043 01/05/24 0041   (!) 141/82 80 18 97 °F (36.1 °C) 100 %      MAP       --                Physical Exam    Nursing note and vitals reviewed.  Constitutional: Vital signs are normal. He appears well-developed and well-nourished. He is not diaphoretic. He is active. He does not appear ill. No distress.   HENT:   Head: Normocephalic and atraumatic.   Right Ear: External ear normal.   Left Ear: External ear normal.   Nose: Nose normal.   Mouth/Throat: Uvula is midline, oropharynx is clear and moist and mucous membranes are normal.   Eyes: Conjunctivae, EOM and lids are normal. Pupils are equal, round, and reactive to light. Right eye exhibits no discharge. Left eye exhibits no discharge. No scleral icterus.   Neck: Phonation normal. Neck supple.   Normal range of motion.   Full passive range of motion without pain.     Cardiovascular:  Normal rate and regular rhythm.           Pulmonary/Chest: Effort normal and breath sounds normal. No respiratory distress.   Abdominal: He exhibits no distension.   Musculoskeletal:         General: Normal range of motion.      Cervical back: Full passive range of motion without pain, normal range of motion and neck supple.     Neurological: He is alert and oriented to person, place, and time. GCS eye subscore is 4. GCS verbal subscore is 5. GCS motor subscore is 6.   Skin: Skin is dry. Capillary refill takes less than 2 seconds.         ED Course   Procedures  Labs Reviewed   POCT INFLUENZA A/B MOLECULAR   SARS-COV-2 RDRP GENE   POCT STREP A MOLECULAR          Imaging Results    None          Medications   ketorolac injection 15 mg (15 mg Intramuscular Given 1/5/24 0147)   dextromethorphan-guaiFENesin  mg per 12 hr tablet 1 tablet (1 tablet Oral Given 1/5/24 0147)     Medical Decision Making  28-year-old male with past medical history asthma, eczema, and schizoaffective schizophrenia presents to ED for cough.  Patient's chart  and medical history reviewed.    Ddx:  COVID  Flu  Strep throat  Viral URI    Patient's vitals reviewed.  Afebrile, no respiratory distress, and nontoxic-appearing in the ED. patient's physical exam is unremarkable.  Patient given Toradol and guaifenesin DM in the ED.  Patient is COVID, flu, and strep negative. Discussed with patient this is most likely a viral upper respiratory infection which will take time to clear from his system.  Discussed with patient to stay well rested and hydrated. Patient given will be sent home on naproxen, Flonase, Zyrtec, Tessalon Perles, and promethazine DM cough syrup for symptomatic control.  Patient agrees with this plan. Discussed with him strict return precautions, he verbalized understanding. Patient is stable for discharge.       Amount and/or Complexity of Data Reviewed  External Data Reviewed: notes.  Labs: ordered.    Risk  OTC drugs.  Prescription drug management.                                      Clinical Impression:  Final diagnoses:  [J06.9] Viral URI with cough (Primary)          ED Disposition Condition    Discharge Stable          ED Prescriptions       Medication Sig Dispense Start Date End Date Auth. Provider    cetirizine (ZYRTEC) 10 MG tablet Take 1 tablet (10 mg total) by mouth daily as needed for Allergies or Rhinitis. 30 tablet 1/5/2024 -- Holdsworth, Alayna, PA-C    fluticasone propionate (FLONASE) 50 mcg/actuation nasal spray 1 spray (50 mcg total) by Each Nostril route 2 (two) times daily as needed for Rhinitis or Allergies. 15 g 1/5/2024 -- Holdsworth, Alayna, PA-C    benzonatate (TESSALON) 100 MG capsule Take 1 capsule (100 mg total) by mouth 3 (three) times daily as needed for Cough. 30 capsule 1/5/2024 -- Holdsworth, Alayna, PA-C    promethazine-dextromethorphan (PROMETHAZINE-DM) 6.25-15 mg/5 mL Syrp Take 5 mLs by mouth every 4 (four) hours as needed (cough/congestion). 180 mL 1/5/2024 -- Holdsworth, Alayna, PA-C    naproxen (NAPROSYN) 500 MG tablet  Take 1 tablet (500 mg total) by mouth 2 (two) times daily as needed (Pain). 30 tablet 1/5/2024 -- Holdsworth, Alayna, PA-C          Follow-up Information       Follow up With Specialties Details Why Contact Info    [facility] -    [address]  [place] [address]  [phone]               Holdsworth, Alayna, PA-C  01/05/24 0212

## 2024-01-05 NOTE — DISCHARGE INSTRUCTIONS

## 2024-01-05 NOTE — ED PROVIDER NOTES
Encounter Date: 1/5/2024       History     Chief Complaint   Patient presents with    Headache     Pt arrived to the ED due to cough and headache x 1 day. Pt seen earlier this morning for symptoms in ED      28-year-old male with a history of allergic rhinitis, schizoaffective disorder, asthma, and others presenting for a mild headache.  He is requesting Tylenol.  Patient was seen in this ED earlier today and diagnosed with a viral URI.  He has been sitting in the lobby for several hours since discharge and states that he has developed a headache and would like Tylenol.  He does not want to be seen again but just wants Tylenol.  Also requested pauline crackers.  No other complaints or concerns at this time.    The history is provided by the patient. No  was used.     Review of patient's allergies indicates:   Allergen Reactions    Peanut Anaphylaxis     Past Medical History:   Diagnosis Date    Asthma     Eczema     Schizo affective schizophrenia      Past Surgical History:   Procedure Laterality Date    colon surgery       Family History   Problem Relation Age of Onset    Kidney disease Father     Hypertension Father      Social History     Tobacco Use    Smoking status: Every Day     Current packs/day: 0.50     Types: Cigarettes    Smokeless tobacco: Never   Substance Use Topics    Alcohol use: Not Currently    Drug use: Yes     Types: Marijuana     Review of Systems   Constitutional:  Negative for chills and fever.   HENT:  Negative for sore throat.    Respiratory:  Negative for shortness of breath.    Cardiovascular:  Negative for chest pain.   Gastrointestinal:  Negative for nausea.   Genitourinary:  Negative for dysuria.   Musculoskeletal:  Negative for back pain.   Skin:  Negative for rash.   Neurological:  Positive for headaches. Negative for weakness.   Hematological:  Does not bruise/bleed easily.       Physical Exam     Initial Vitals [01/05/24 1703]   BP Pulse Resp Temp SpO2   (!)  154/80 74 20 98 °F (36.7 °C) 98 %      MAP       --         Physical Exam    Nursing note and vitals reviewed.  Constitutional: He appears well-developed and well-nourished. He is not diaphoretic. No distress.   HENT:   Head: Normocephalic and atraumatic.   Right Ear: External ear normal.   Left Ear: External ear normal.   Nose: Nose normal.   Eyes: EOM are normal. Right eye exhibits no discharge. Left eye exhibits no discharge.   Neck: Neck supple. No tracheal deviation present.   Normal range of motion.  Cardiovascular:  Normal rate.           Pulmonary/Chest: No stridor. No respiratory distress.   Abdominal: Abdomen is soft. He exhibits no distension. There is no abdominal tenderness.   Musculoskeletal:         General: No tenderness. Normal range of motion.      Cervical back: Normal range of motion and neck supple.     Neurological: He is alert and oriented to person, place, and time. He has normal strength. No cranial nerve deficit.   Skin: Skin is warm and dry.   Psychiatric: He has a normal mood and affect. His behavior is normal. Judgment and thought content normal.         ED Course   Procedures  Labs Reviewed - No data to display       Imaging Results    None          Medications   acetaminophen tablet 1,000 mg (1,000 mg Oral Given 1/5/24 1707)     Medical Decision Making  Patient given Tylenol as requested.  He has no new concerns or complaints since previous visit.  Referred to discharge instructions from visit earlier today for further follow-up instructions.  Shared decision-making with the patient.    Amount and/or Complexity of Data Reviewed  External Data Reviewed: notes.    Risk  OTC drugs.                                      Clinical Impression:  Final diagnoses:  [J06.9] Viral URI (Primary)  [R51.9] Nonintractable headache, unspecified chronicity pattern, unspecified headache type          ED Disposition Condition    Discharge Stable          ED Prescriptions    None       Follow-up Information        Follow up With Specialties Details Why Contact Info    [facility] -  Schedule an appointment as soon as possible for a visit in 1 week For further evaluation [address] [place] [address]  [phone]      [facility] [place] [facility] Emergency Medicine Go to  If symptoms worsen, As needed [address] [facility] [place] [id number] [phone]             Yaya Lopez, NP  01/09/24 2998

## 2024-02-08 ENCOUNTER — HOSPITAL ENCOUNTER (EMERGENCY)
Facility: HOSPITAL | Age: 29
Discharge: HOME | End: 2024-02-08
Attending: STUDENT IN AN ORGANIZED HEALTH CARE EDUCATION/TRAINING PROGRAM
Payer: MEDICAID

## 2024-02-08 VITALS
DIASTOLIC BLOOD PRESSURE: 64 MMHG | OXYGEN SATURATION: 99 % | TEMPERATURE: 99 F | HEIGHT: 66 IN | WEIGHT: 145 LBS | SYSTOLIC BLOOD PRESSURE: 130 MMHG | BODY MASS INDEX: 23.3 KG/M2 | HEART RATE: 100 BPM | RESPIRATION RATE: 18 BRPM

## 2024-02-08 DIAGNOSIS — U07.1 COVID-19: Primary | ICD-10-CM

## 2024-02-08 LAB
CTP QC/QA: YES
MOLECULAR STREP A: NEGATIVE
POC MOLECULAR INFLUENZA A AGN: NEGATIVE
POC MOLECULAR INFLUENZA B AGN: NEGATIVE
SARS-COV-2 RDRP RESP QL NAA+PROBE: POSITIVE

## 2024-02-08 PROCEDURE — 25000003 PHARM REV CODE 250

## 2024-02-08 PROCEDURE — 99284 EMERGENCY DEPT VISIT MOD MDM: CPT | Mod: 25

## 2024-02-08 PROCEDURE — 63600175 PHARM REV CODE 636 W HCPCS

## 2024-02-08 PROCEDURE — 96372 THER/PROPH/DIAG INJ SC/IM: CPT

## 2024-02-08 PROCEDURE — 87635 SARS-COV-2 COVID-19 AMP PRB: CPT

## 2024-02-08 PROCEDURE — 87651 STREP A DNA AMP PROBE: CPT

## 2024-02-08 PROCEDURE — 87502 INFLUENZA DNA AMP PROBE: CPT

## 2024-02-08 RX ORDER — CETIRIZINE HYDROCHLORIDE 10 MG/1
10 TABLET ORAL DAILY
Qty: 30 TABLET | Refills: 0 | Status: SHIPPED | OUTPATIENT
Start: 2024-02-08 | End: 2024-03-09

## 2024-02-08 RX ORDER — ACETAMINOPHEN 500 MG
1000 TABLET ORAL
Status: COMPLETED | OUTPATIENT
Start: 2024-02-08 | End: 2024-02-08

## 2024-02-08 RX ORDER — PHENOL 1.4 %
AEROSOL, SPRAY (ML) MUCOUS MEMBRANE
Qty: 177 ML | Refills: 0 | Status: SHIPPED | OUTPATIENT
Start: 2024-02-08

## 2024-02-08 RX ORDER — ACETAMINOPHEN 500 MG
500 TABLET ORAL EVERY 6 HOURS PRN
Qty: 30 TABLET | Refills: 0 | Status: SHIPPED | OUTPATIENT
Start: 2024-02-08

## 2024-02-08 RX ORDER — GUAIFENESIN AND DEXTROMETHORPHAN HYDROBROMIDE 10; 100 MG/5ML; MG/5ML
5 SYRUP ORAL EVERY 6 HOURS
Qty: 473 ML | Refills: 0 | Status: SHIPPED | OUTPATIENT
Start: 2024-02-08 | End: 2024-02-18

## 2024-02-08 RX ORDER — KETOROLAC TROMETHAMINE 30 MG/ML
30 INJECTION, SOLUTION INTRAMUSCULAR; INTRAVENOUS
Status: COMPLETED | OUTPATIENT
Start: 2024-02-08 | End: 2024-02-08

## 2024-02-08 RX ADMIN — ACETAMINOPHEN 1000 MG: 500 TABLET ORAL at 01:02

## 2024-02-08 RX ADMIN — KETOROLAC TROMETHAMINE 30 MG: 30 INJECTION, SOLUTION INTRAMUSCULAR; INTRAVENOUS at 02:02

## 2024-02-08 NOTE — Clinical Note
"Jensen JOSE"John Bland was seen and treated in our emergency department on 2/8/2024.     COVID-19 is present in our communities across the state. There is limited testing for COVID at this time, so not all patients can be tested. In this situation, your employee meets the following criteria:    Jensen Bland has met the criteria for COVID-19 testing and has a POSITIVE result. He can return to work once they are asymptomatic for 24 hours without the use of fever reducing medications AND at least five days from the first positive result. A mask is recommended for 5 days post quarantine.     If you have any questions or concerns, or if I can be of further assistance, please do not hesitate to contact me.    Sincerely,             Christo Godoy PA-C"

## 2024-02-08 NOTE — DISCHARGE INSTRUCTIONS
Thank you for coming to our Emergency Department today. It is important to remember that some problems or medical conditions are difficult to diagnose and may not be found or addressed during your Emergency Department visit.  These conditions often start with non-specific symptoms and can only be diagnosed on follow up visits with your primary care physician or specialist when the symptoms continue or change. Please remember that all medical conditions can change, and we cannot predict how you will be feeling tomorrow or the next day. Return to the ER with any questions/concerns, new/concerning symptoms, worsening or failure to improve.   Be sure to follow up with your primary care doctor and review all labs/imaging/tests that were performed during your ER visit with them. It is very common for us to identify non-emergent incidental findings which must be followed up with your primary care physician.  Some labs/imaging/tests may be outside of the normal range, and require non-emergent follow-up and/or further investigation/treatment/procedures/testing to help diagnose/exclude/prevent complications or other potentially serious medical conditions. Some abnormalities may not have been discussed or addressed during your ER visit. Some lab results may not return during your ER visit but can be accessible by downloading the free [facility] or by visiting [email or web address] . It is important for you to review all labs/imaging/tests which are outside of the normal range with your physician.  An ER visit does not replace a primary care visit, and many screening tests or follow-up tests cannot be ordered by an ER doctor or performed by the ER. Some tests may even require pre-approval.  If you do not have a primary care doctor, you may contact the one listed on your discharge paperwork or you may also call the [facility] Appointment Desk at [phone] , or [address] [place] [address] at  [phone] to schedule an  appointment, or establish care with a primary care doctor or even a specialist and to obtain information about local resources. It is important to your health that you have a primary care doctor.  Please take all medications as directed. We have done our best to select a medication for you that will treat your condition however, all medications may potentially have side-effects and it is impossible to predict which medications may give you side-effects or what those side-effects (if any) those medications may give you.  If you feel that you are having a negative effect or side-effect of any medication you should stop taking those medications immediately and seek medical attention. If you feel that you are having a life-threatening reaction call 911.  Do not drive, swim, climb to height, take a bath, operate heavy machinery, drink alcohol or take potentially sedating medications, sign any legal documents or make any important decisions for 24 hours if you have received any pain medications, sedatives or mood altering drugs during your ER visit or within 24 hours of taking them if they have been prescribed to you.   You can find additional resources for Dentists, hearing aids, durable medical equipment, low cost pharmacies and other resources at [email or web address]  Patient agrees with this plan. Discussed with her strict return precautions, she verbalized understanding. Patient is stable for discharge.

## 2024-02-08 NOTE — ED PROVIDER NOTES
"Encounter Date: 2/8/2024       History     Chief Complaint   Patient presents with    Fever     Asked pt what nata him to ER. Pt stated " every thing". When asked him to elaborate he said " everything". Pt noted with fever.      HPI    28-year-old male past medical history of asthma and schizoaffective disorder presenting to the emergency department today with a complaint of everything x2 days.  Patient endorses having some cough, congestion, runny nose.  Patient was not know if he is any sick contacts.  Patient was not taken any medication prior to arrival.  Patient was also endorsing full body aches.  Patient denies any chest pain, shortness on breath, nausea, vomiting, diarrhea, abdominal pain, headache, dizziness, weakness.    Review of patient's allergies indicates:   Allergen Reactions    Peanut Anaphylaxis     Past Medical History:   Diagnosis Date    Asthma     Eczema     Schizo affective schizophrenia      Past Surgical History:   Procedure Laterality Date    colon surgery       Family History   Problem Relation Age of Onset    Kidney disease Father     Hypertension Father      Social History     Tobacco Use    Smoking status: Every Day     Current packs/day: 0.50     Types: Cigarettes    Smokeless tobacco: Never   Substance Use Topics    Alcohol use: Not Currently    Drug use: Yes     Types: Marijuana     Review of Systems   Constitutional:  Positive for chills. Negative for diaphoresis, fatigue and fever.   HENT:  Positive for congestion and rhinorrhea. Negative for ear discharge, ear pain, sore throat and trouble swallowing.    Eyes:  Negative for photophobia, redness and visual disturbance.   Respiratory:  Positive for cough. Negative for shortness of breath.    Cardiovascular:  Negative for chest pain, palpitations and leg swelling.   Gastrointestinal:  Negative for abdominal pain, diarrhea, nausea and vomiting.   Genitourinary:  Negative for difficulty urinating, dysuria, flank pain, frequency and " hematuria.   Musculoskeletal:  Positive for myalgias (generalized). Negative for arthralgias, back pain, neck pain and neck stiffness.   Skin:  Negative for pallor and rash.   Neurological:  Negative for dizziness, weakness, light-headedness and headaches.   Hematological:  Does not bruise/bleed easily.       Physical Exam     Initial Vitals [02/08/24 1250]   BP Pulse Resp Temp SpO2   (!) 132/58 102 17 (!) 101.5 °F (38.6 °C) 98 %      MAP       --         Physical Exam    Nursing note and vitals reviewed.  Constitutional: He appears well-developed and well-nourished. He is not diaphoretic. He does not appear ill. No distress.   HENT:   Head: Normocephalic and atraumatic.   Right Ear: Tympanic membrane, external ear and ear canal normal.   Left Ear: Tympanic membrane, external ear and ear canal normal.   Nose: Mucosal edema and rhinorrhea present. Right sinus exhibits frontal sinus tenderness. Right sinus exhibits no maxillary sinus tenderness. Left sinus exhibits no maxillary sinus tenderness and no frontal sinus tenderness.   Mouth/Throat: Uvula is midline and mucous membranes are normal. No trismus in the jaw. No uvula swelling. Posterior oropharyngeal erythema present. No oropharyngeal exudate, posterior oropharyngeal edema or tonsillar abscesses.   Postnasal drip noted.  Uvula midline without any erythema or swelling.  No submandibular or sublingual swelling or erythema.  No trismus.  Normal jaw occlusion.  No evidence of tonsillar abscess.  No muffled voice.    Patient is tolerating secretions without difficulty.   Patient is speaking in full sentences on exam without difficulty.  There is no postauricular swelling, or overlying erythema or tenderness to palpation over mastoids bilaterally.   Eyes: Conjunctivae, EOM and lids are normal. Pupils are equal, round, and reactive to light. Right eye exhibits no discharge. Left eye exhibits no discharge. No scleral icterus.   Neck: Trachea normal.   Normal range of  motion.   Full passive range of motion without pain.     Cardiovascular:  Normal rate, regular rhythm, normal heart sounds and normal pulses.     Exam reveals no distant heart sounds and no friction rub.       Pulmonary/Chest: Effort normal and breath sounds normal. No respiratory distress.   Abdominal: Abdomen is soft. Bowel sounds are normal. He exhibits no distension and no pulsatile midline mass. There is no abdominal tenderness.   No right CVA tenderness.  No left CVA tenderness. There is no rebound and no guarding.   Musculoskeletal:         General: Normal range of motion.      Right shoulder: Normal.      Left shoulder: Normal.      Right elbow: Normal.      Left elbow: Normal.      Right wrist: Normal.      Left wrist: Normal.      Right hand: Normal.      Left hand: Normal.      Cervical back: Normal, full passive range of motion without pain and normal range of motion.      Thoracic back: Normal.      Lumbar back: Normal.      Right hip: Normal.      Left hip: Normal.      Right knee: Normal.      Left knee: Normal.      Right lower leg: Normal.      Left lower leg: Normal.      Right ankle: Normal.      Left ankle: Normal.      Right foot: Normal.      Left foot: Normal.     Lymphadenopathy:        Head (right side): No submental, no submandibular, no tonsillar, no preauricular, no posterior auricular and no occipital adenopathy present.        Head (left side): No submental, no submandibular, no tonsillar, no preauricular, no posterior auricular and no occipital adenopathy present.     He has no cervical adenopathy.   Neurological: He is alert and oriented to person, place, and time. He has normal strength. No cranial nerve deficit or sensory deficit. Gait normal.   Skin: Skin is warm and dry. Capillary refill takes less than 2 seconds. No bruising, no ecchymosis and no rash noted. No erythema.   Psychiatric: He has a normal mood and affect. His speech is normal and behavior is normal. Thought content  "normal.         ED Course   Procedures  Labs Reviewed   SARS-COV-2 RDRP GENE - Abnormal; Notable for the following components:       Result Value    POC Rapid COVID Positive (*)     All other components within normal limits   POCT STREP A MOLECULAR   POCT INFLUENZA A/B MOLECULAR          Imaging Results    None          Medications   acetaminophen tablet 1,000 mg (1,000 mg Oral Given 2/8/24 1323)   ketorolac injection 30 mg (30 mg Intramuscular Given 2/8/24 1407)     Medical Decision Making  28-year-old male past medical history of asthma and schizoaffective disorder presenting to the emergency department today with a complaint of "everything" x2 days.  Patient endorses having some cough, congestion, runny nose.  Patient was not know if he is any sick contacts.  Patient was not taken any medication prior to arrival.  Patient was also endorsing full body aches.  Patient denies any chest pain, shortness on breath, nausea, vomiting, diarrhea, abdominal pain, headache, dizziness, weakness.  Patient's chart and medical history reviewed.  Patient's vitals reviewed.  They are febrile and Tylenol was given at this time, no respiratory distress, nontoxic-appearing in the ED.  Patient is an afebrile, well-appearing 28 y.o. male. VSS.  Vital signs do not suggest sepsis. Lung sounds are clear and not consistent with pneumonia. There is no neck pain or limited ROM to suggest retropharyngeal abscess or meningitis. Tolerating PO, non-toxic appearing, no hypoxia. The patient remained comfortable and stable during their visit in the ED.  Details of ED course documented in ED workup.     Differential Diagnosis is considered, but is not limited to: COVID, Flu, Strep throat, Viral URI, pneumonia.  Also considered but less likely:  PTA/RPA: no hot potato voice, no uvular deviation,  Esophageal rupture: No history of dysphagia  Unlikely deep space infection/Rk's, no submandibular or sublingual erythema or swelling.  Low suspicion for " CNS infection bacterial sinusitis, or pneumonia given exam and history.  Unlikely EBV as  no pharyngeal exudate, posterior LAD, or splenomegaly.    COVID test positive. Influenza test negative. Strep test negative.    All historical, clinical, and laboratory findings reviewed. Patient with constellation of symptoms most consistent with a viral URI. There are no concerning features on physical exam to suggest an emergent or life threatening condition or an invasive bacterial infection, including, but not limited to: bacterial otitis media/externa, sinusitis, pharyngitis, or peritonsillar abscess. No further intervention is indicated at this time. The patient is at low risk for an emergent/life threatening medical condition at this time, and I am of the belief that that it is safe to discharge the patient from the emergency department.     Patient instructed to follow up with PCP in 3-5 days for recheck of today's complaints. Patient has been counseled regarding the need for follow-up as well as the indications to return to the emergency room should new or worrisome developments. Discharge and follow-up instructions discussed with the patient who expressed understanding and willingness to comply with recommendations. Patient discharged from the emergency department in stable condition, in no acute distress.  I discussed with the patient/family the diagnosis, treatment plan, indications for return to the emergency department, and for expected follow-up. The patient/family verbalized an understanding. The patient/family is asked if there are any questions or concerns. We discuss the case, until all issues are addressed to the patient/family's satisfaction. Patient/family understands and is agreeable to the plan.   BERNABE SCHMIDT    DISCLAIMER: This note was prepared with UBIK[employer] voice recognition transcription software. Garbled syntax, mangled pronouns, and other bizarre constructions may be attributed to that software  system.        Amount and/or Complexity of Data Reviewed  Labs: ordered.    Risk  OTC drugs.                                      Clinical Impression:  Final diagnoses:  [U07.1] COVID-19 (Primary)          ED Disposition Condition    Discharge Stable          ED Prescriptions       Medication Sig Dispense Start Date End Date Auth. Provider    benzocaine-menthoL 6-10 mg lozenge Take 1 lozenge by mouth every 2 (two) hours as needed for Pain. 18 tablet 2/8/2024 -- Christo Godoy PA-C    dextromethorphan-guaiFENesin  mg/5 ml (ROBITUSSIN-DM)  mg/5 mL liquid Take 5 mLs by mouth every 6 (six) hours. for 10 days 473 mL 2/8/2024 2/18/2024 Christo Godoy PA-C    acetaminophen (TYLENOL) 500 MG tablet Take 1 tablet (500 mg total) by mouth every 6 (six) hours as needed for Pain. 30 tablet 2/8/2024 -- Christo Godoy PA-C    phenoL (CHLORASEPTIC THROAT SPRAY) 1.4 % SprA by Mucous Membrane route every 2 (two) hours. 177 mL 2/8/2024 -- Christo Godoy PA-C    cetirizine (ZYRTEC) 10 MG tablet Take 1 tablet (10 mg total) by mouth once daily. 30 tablet 2/8/2024 3/9/2024 Christo Godoy PA-C          Follow-up Information       Follow up With Specialties Details Why Contact Info    Your primary care physician  Schedule an appointment as soon as possible for a visit in 3 days for follow up              Christo Godoy PA-C  02/08/24 1560

## 2024-11-21 ENCOUNTER — HOSPITAL ENCOUNTER (EMERGENCY)
Facility: HOSPITAL | Age: 29
Discharge: HOME | End: 2024-11-21
Attending: EMERGENCY MEDICINE
Payer: MEDICAID

## 2024-11-21 VITALS
BODY MASS INDEX: 23.21 KG/M2 | SYSTOLIC BLOOD PRESSURE: 112 MMHG | DIASTOLIC BLOOD PRESSURE: 70 MMHG | HEIGHT: 63 IN | RESPIRATION RATE: 20 BRPM | WEIGHT: 131 LBS | OXYGEN SATURATION: 98 % | HEART RATE: 100 BPM | TEMPERATURE: 98 F

## 2024-11-21 DIAGNOSIS — S91.134A: Primary | ICD-10-CM

## 2024-11-21 PROCEDURE — 25000003 PHARM REV CODE 250

## 2024-11-21 PROCEDURE — 99284 EMERGENCY DEPT VISIT MOD MDM: CPT | Mod: 25

## 2024-11-21 RX ORDER — CEPHALEXIN 500 MG/1
1000 CAPSULE ORAL 2 TIMES DAILY
Qty: 12 CAPSULE | Refills: 0 | Status: SHIPPED | OUTPATIENT
Start: 2024-11-21 | End: 2024-11-24

## 2024-11-21 RX ORDER — MUPIROCIN 20 MG/G
1 OINTMENT TOPICAL
Status: COMPLETED | OUTPATIENT
Start: 2024-11-21 | End: 2024-11-21

## 2024-11-21 RX ORDER — CIPROFLOXACIN 500 MG/1
500 TABLET ORAL 2 TIMES DAILY
Qty: 6 TABLET | Refills: 0 | Status: SHIPPED | OUTPATIENT
Start: 2024-11-21 | End: 2024-11-24

## 2024-11-21 RX ADMIN — MUPIROCIN 1 TUBE: 20 OINTMENT TOPICAL at 11:11

## 2024-11-21 NOTE — DISCHARGE INSTRUCTIONS
Thank you for coming to our Emergency Department today. It is important to remember that some problems or medical conditions are difficult to diagnose and may not be found or addressed during your Emergency Department visit.  These conditions often start with non-specific symptoms and can only be diagnosed on follow up visits with your primary care physician or specialist when the symptoms continue or change. Please remember that all medical conditions can change, and we cannot predict how you will be feeling tomorrow or the next day. Return to the ER with any questions/concerns, new/concerning symptoms including fever, chest pain, shortness of breath, loss of consciousness, dizziness, weakness, worsening symptoms, failure to improve, or any other concerns. Also, please follow up with your Primary Care Physician and/or Pediatrician in the next 1-2 days to review your ED visit in entirety and for re-evaluation.   Be sure to follow up with your primary care doctor and review all labs/imaging/tests that were performed during your ER visit with them. It is very common for us to identify non-emergent incidental findings which must be followed up with your primary care physician.  Some labs/imaging/tests may be outside of the normal range, and require non-emergent follow-up and/or further investigation/treatment/procedures/testing to help diagnose/exclude/prevent complications or other potentially serious medical conditions. Some abnormalities may not have been discussed or addressed during your ER visit. Some lab results may not return during your ER visit but can be accessible by downloading the free [facility] or by visiting [email or web address] . It is important for you to review all labs/imaging/tests which are outside of the normal range with your physician.  An ER visit does not replace a primary care visit, and many screening tests or follow-up tests cannot be ordered by an ER doctor or performed by  the ER. Some tests may even require pre-approval.  If you do not have a primary care doctor, you may contact the one listed on your discharge paperwork or you may also call the [facility] Appointment Desk at [phone] , or [address] [place] [address] at  [phone] to schedule an appointment, or establish care with a primary care doctor or even a specialist and to obtain information about local resources. It is important to your health that you have a primary care doctor.  Please take all medications as directed. We have done our best to select a medication for you that will treat your condition however, all medications may potentially have side-effects and it is impossible to predict which medications may give you side-effects or what those side-effects (if any) those medications may give you.  If you feel that you are having a negative effect or side-effect of any medication you should stop taking those medications immediately and seek medical attention. If you feel that you are having a life-threatening reaction call 911.  Do not drive, swim, climb to height, take a bath, operate heavy machinery, drink alcohol or take potentially sedating medications, sign any legal documents or make any important decisions for 24 hours if you have received any pain medications, sedatives or mood altering drugs during your ER visit or within 24 hours of taking them if they have been prescribed to you.   You can find additional resources for Dentists, hearing aids, durable medical equipment, low cost pharmacies and other resources at [email or web address]  Patient agrees with this plan. Discussed with her strict return precautions, they verbalized understanding. Patient is stable for discharge.   § Please take all medication as prescribed.

## 2024-11-21 NOTE — ED TRIAGE NOTES
Pt presents to ER with complaints of toe pain on his right foot. Pt rates pain 6/10 at this time. Pt denies taking any meds. Pt denies any other issues at this time.

## 2024-11-21 NOTE — ED PROVIDER NOTES
"Encounter Date: 11/21/2024    SCRIBE #1 NOTE: I, Rachel Garcia, am scribing for, and in the presence of,  Christo Godoy PA-C. I have scribed the following portions of the note - Other sections scribed: HPI, ROS.       History     Chief Complaint   Patient presents with    Toe Injury     Believes he stepped on a piece of wood just PTA with injury to "one of my right toes."      Jensen Bland is a 29 y.o. male, with PMHx of eczema, who presents to the ED complaining of pain to right foot that began just prior to arrival.  Patient believes he might have stepped on a piece of wood and punctured his foot/ 2nd toe. Bleeding reported to area. Reports he was wearing slides but does not see puncture hole in shoe. He did not attempt treatment PTA. He reports tetanus is up-to-date. Denies pain anywhere else. No other complaints at this time.      The history is provided by the patient. No  was used.     Review of patient's allergies indicates:   Allergen Reactions    Peanut Anaphylaxis     Past Medical History:   Diagnosis Date    Asthma     Eczema     Schizo affective schizophrenia      Past Surgical History:   Procedure Laterality Date    colon surgery       Family History   Problem Relation Name Age of Onset    Kidney disease Father      Hypertension Father       Social History     Tobacco Use    Smoking status: Every Day     Current packs/day: 0.50     Types: Cigarettes    Smokeless tobacco: Never   Substance Use Topics    Alcohol use: Not Currently    Drug use: Yes     Types: Marijuana     Review of Systems   Constitutional:  Negative for chills, diaphoresis, fatigue and fever.   HENT:  Negative for congestion, ear discharge, ear pain, rhinorrhea, sore throat and trouble swallowing.    Eyes:  Negative for photophobia, redness and visual disturbance.   Respiratory:  Negative for cough and shortness of breath.    Cardiovascular:  Negative for chest pain, palpitations and leg swelling. "   Gastrointestinal:  Negative for abdominal pain, diarrhea, nausea and vomiting.   Genitourinary:  Negative for difficulty urinating, dysuria, flank pain, frequency and hematuria.   Musculoskeletal:  Positive for myalgias (R foot/ 3nd digit). Negative for arthralgias, back pain, neck pain and neck stiffness.   Skin:  Positive for wound (R foot/3nd digit). Negative for pallor and rash.   Neurological:  Negative for dizziness, weakness, light-headedness, numbness and headaches.   Hematological:  Does not bruise/bleed easily.       Physical Exam     Initial Vitals [11/21/24 1140]   BP Pulse Resp Temp SpO2   112/70 100 20 98.3 °F (36.8 °C) 98 %      MAP       --         Physical Exam    Nursing note and vitals reviewed.  Constitutional: He appears well-developed and well-nourished. He is not diaphoretic. He does not appear ill. No distress.   HENT:   Head: Normocephalic and atraumatic.   Right Ear: External ear normal.   Left Ear: External ear normal.   Nose: Nose normal. Mouth/Throat: Uvula is midline and oropharynx is clear and moist.   Eyes: Conjunctivae and EOM are normal. Pupils are equal, round, and reactive to light. Right eye exhibits no discharge. Left eye exhibits no discharge. No scleral icterus.   Neck: Trachea normal. Neck supple.   Normal range of motion.   Full passive range of motion without pain.     Cardiovascular:  Normal rate, regular rhythm, normal heart sounds, intact distal pulses and normal pulses.     Exam reveals no distant heart sounds and no friction rub.       Pulmonary/Chest: Effort normal and breath sounds normal. No respiratory distress.   Abdominal: Abdomen is soft. Bowel sounds are normal. He exhibits no distension and no pulsatile midline mass. There is no abdominal tenderness.   No right CVA tenderness.  No left CVA tenderness. There is no rebound and no guarding.   Musculoskeletal:         General: Normal range of motion.      Right shoulder: Normal.      Left shoulder: Normal.       Right elbow: Normal.      Left elbow: Normal.      Right wrist: Normal.      Left wrist: Normal.      Right hand: Normal.      Left hand: Normal.      Cervical back: Normal, full passive range of motion without pain, normal range of motion and neck supple.      Thoracic back: Normal.      Lumbar back: Normal.      Right hip: Normal.      Left hip: Normal.      Right knee: Normal.      Left knee: Normal.      Right lower leg: Normal.      Left lower leg: Normal.      Right ankle: Normal.      Left ankle: Normal.      Right foot: Normal. Normal range of motion and normal capillary refill. No swelling, deformity, bunion, Charcot foot, foot drop, prominent metatarsal heads, laceration, tenderness, bony tenderness or crepitus. Normal pulse.      Left foot: Normal.        Feet:      Neurological: He is alert and oriented to person, place, and time. He has normal strength. No cranial nerve deficit or sensory deficit. Coordination and gait normal.   Skin: Skin is warm and dry. Capillary refill takes less than 2 seconds. No bruising, no ecchymosis and no rash noted. No erythema.   Psychiatric: He has a normal mood and affect. His speech is normal and behavior is normal. Thought content normal.         ED Course   Procedures  Labs Reviewed - No data to display       Imaging Results              X-Ray Toe 2 or More Views Right (Final result)  Result time 11/21/24 13:08:25      Final result by Jason Long MD (11/21/24 13:08:25)                   Impression:      No obvious radiopaque foreign body allowing for overlapping radiopaque bandage material.      Electronically signed by: Jason Long MD  Date:    11/21/2024  Time:    13:08               Narrative:    EXAMINATION:  XR TOE 2 OR MORE VIEWS RIGHT    CLINICAL HISTORY:  puncture wound, rule out foreign body.;    TECHNIQUE:  Three views of the right toes were performed    COMPARISON:  None.    FINDINGS:  No acute displaced fracture.  No dislocation.  Soft tissue  edema and overlying bandage material.  No obvious radiopaque foreign body allowing for overlapping radiopaque bandage material.                                       Medications   mupirocin 2 % ointment 1 Tube (1 Tube Topical (Top) Given 11/21/24 1156)     Medical Decision Making  Jensen Bland is a 29 y.o. male, with PMHx of eczema, who presents to the ED complaining of pain to right foot that began just prior to arrival.  Patient believes he might have stepped on a piece of wood and punctured his foot/ 2nd toe. Bleeding reported to area. Reports he was wearing slides but does not see puncture hole in shoe. He did not attempt treatment PTA. He reports tetanus is up-to-date. Denies pain anywhere else. No other complaints at this time.    Patient's chart and medical history reviewed.  Patient's vitals reviewed.  They are afebrile, no respiratory distress, nontoxic-appearing in the ED.  Differential diagnosis is considered the following.  - Septic Arthritis, Gout, Osteomyelitis: considered with pain, although unlikely without overlying erythema and swelling/edema, patient is afebrile  - Fracture/Dislocation/retained FB: considered with pain, imaging ordered for further evaluation  - Compartment Syndrome: unlikely with 2+ distal pulses, no pallor, no paresthesias, no woody induration.   Patient unsure what exactly he stepped on to cause the injury.   Xray to evaluate for possible osseous injury vs retained FB. Per my interpretation there are no acute osseous abnormalities like fracture or dislocations and no definite FB retained. Small puncture wound questionable through shoe. Patient is UTD on tetanus in May. Will PPX treat for pseudomonas due to puncture in shoe with cipro and keflex. Toe is cleaned and mupirocin applied.   At this time I'll discharge home to follow up with primary care physician in the next 1-2 days for further evaluation.  If the wound continues the pt will need to see podiatry for further  evaluation.  The patient is comfortable with this plan and comfortable going home at this time. After taking into careful account the historical factors and physical exam findings of the patient's presentation today, in conjunction with the empirical and objective data obtained on ED workup, no acute emergent medical condition has been identified. The patient appears to be low risk for an emergent medical condition and I feel it is safe and appropriate at this time for the patient to be discharged to follow-up as detailed in their discharge instructions for reevaluation and possible continued outpatient workup and management. I have discussed the specifics of the workup with the patient and the patient has verbalized understanding of the details of the workup, the diagnosis, the treatment plan, and the need for outpatient follow-up.  Although the patient has no emergent etiology today this does not preclude the development of an emergent condition so in addition, I have advised the patient that they can return to the ED and/or activate EMS at any time with worsening of their symptoms, change of their symptoms, or with any other medical complaint.  The patient remained comfortable and stable during their visit in the ED.  Discharge and follow-up instructions discussed with the patient who expressed understanding and willingness to comply with my recommendations. I discussed with the patient/family the diagnosis, treatment plan, indications for return to the emergency department, and for expected follow-up. Please follow up with your primary doctor in 1-2 days and return to the ED in any new, worsening, or continued symptoms. The patient/family verbalized an understanding. The patient/family is asked if there are any questions or concerns. We discuss the case, until all issues are addressed to the patient/family's satisfaction. Patient/family understands and is agreeable to the plan.    BERNABE SCHMIDT,  ALEXEY    DISCLAIMER: This note was prepared with [employer] voice recognition transcription software. Garbled syntax, mangled pronouns, and other bizarre constructions may be attributed to that software system.      Amount and/or Complexity of Data Reviewed  Radiology: ordered and independent interpretation performed.    Risk  Prescription drug management.            Scribe Attestation:   Scribe #1: I performed the above scribed service and the documentation accurately describes the services I performed. I attest to the accuracy of the note.        ED Course as of 11/21/24 1326   Thu Nov 21, 2024   1142 Last tetanus 5/19/2024 [AF]      ED Course User Index  [AF] Christo Godoy PA-C                         I, Christo Godoy PA-C, personally performed the services described in this documentation. All medical record entries made by the scribe were at my direction and in my presence. I have reviewed the chart and agree that the record reflects my personal performance and is accurate and complete.      DISCLAIMER: This note was prepared with [employer] voice recognition transcription software. Garbled syntax, mangled pronouns, and other bizarre constructions may be attributed to that software system.    Clinical Impression:  Final diagnoses:  [S91.134A] Puncture wound of lesser toe of right foot (Primary)          ED Disposition Condition    Discharge Stable          ED Prescriptions       Medication Sig Dispense Start Date End Date Auth. Provider    ciprofloxacin HCl (CIPRO) 500 MG tablet Take 1 tablet (500 mg total) by mouth 2 (two) times daily. for 3 days 6 tablet 11/21/2024 11/24/2024 Christo Godoy PA-C    cephALEXin (KEFLEX) 500 MG capsule Take 2 capsules (1,000 mg total) by mouth 2 (two) times a day. for 3 days 12 capsule 11/21/2024 11/24/2024 Christo Godoy PA-C          Follow-up Information       Follow up With Specialties Details Why Contact [facility] -  Schedule an appointment as  soon as possible for a visit in 1 day for follow up if you do not currently have a  [address] [place] [address]  [phone]      Your primary care physician  Schedule an appointment as soon as possible for a visit in 1 day for follow up     [facility] Emergency Dept Emergency Medicine Go to  If symptoms worsen [address]  [facility] [place] [id number] [phone]             Christo Godoy, PAMallorieC  11/21/24 1323

## 2025-01-08 ENCOUNTER — HOSPITAL ENCOUNTER (EMERGENCY)
Facility: HOSPITAL | Age: 30
Discharge: HOME | End: 2025-01-08
Attending: STUDENT IN AN ORGANIZED HEALTH CARE EDUCATION/TRAINING PROGRAM
Payer: COMMERCIAL

## 2025-01-08 VITALS
OXYGEN SATURATION: 98 % | WEIGHT: 155 LBS | SYSTOLIC BLOOD PRESSURE: 183 MMHG | BODY MASS INDEX: 27.46 KG/M2 | HEART RATE: 85 BPM | RESPIRATION RATE: 18 BRPM | TEMPERATURE: 98 F | DIASTOLIC BLOOD PRESSURE: 87 MMHG

## 2025-01-08 DIAGNOSIS — L98.9 SKIN LESION OF FACE: Primary | ICD-10-CM

## 2025-01-08 PROBLEM — G89.29 CHRONIC BILATERAL LOW BACK PAIN WITHOUT SCIATICA: Status: ACTIVE | Noted: 2019-05-29

## 2025-01-08 PROBLEM — M54.50 CHRONIC BILATERAL LOW BACK PAIN WITHOUT SCIATICA: Status: ACTIVE | Noted: 2019-05-29

## 2025-01-08 PROBLEM — M54.6 CHRONIC THORACIC SPINE PAIN: Status: ACTIVE | Noted: 2019-05-29

## 2025-01-08 PROBLEM — G89.29 CHRONIC THORACIC SPINE PAIN: Status: ACTIVE | Noted: 2019-05-29

## 2025-01-08 PROBLEM — F33.41 RECURRENT MAJOR DEPRESSIVE DISORDER, IN PARTIAL REMISSION: Status: ACTIVE | Noted: 2018-11-16

## 2025-01-08 PROCEDURE — 99284 EMERGENCY DEPT VISIT MOD MDM: CPT

## 2025-01-08 PROCEDURE — 25000003 PHARM REV CODE 250: Performed by: NURSE PRACTITIONER

## 2025-01-08 RX ORDER — DIVALPROEX SODIUM 250 MG/1
1 TABLET, DELAYED RELEASE ORAL 2 TIMES DAILY
COMMUNITY

## 2025-01-08 RX ORDER — ONDANSETRON 4 MG/1
4 TABLET, ORALLY DISINTEGRATING ORAL EVERY 6 HOURS PRN
Qty: 12 TABLET | Refills: 0 | Status: SHIPPED | OUTPATIENT
Start: 2025-01-08 | End: 2025-01-15

## 2025-01-08 RX ORDER — PROMETHAZINE HYDROCHLORIDE 25 MG/1
25 TABLET ORAL EVERY 6 HOURS PRN
COMMUNITY
Start: 2025-01-02 | End: 2025-01-08 | Stop reason: ALTCHOICE

## 2025-01-08 RX ORDER — HALOPERIDOL 5 MG/1
1 TABLET ORAL 2 TIMES DAILY
COMMUNITY

## 2025-01-08 RX ORDER — VALACYCLOVIR HYDROCHLORIDE 1 G/1
1000 TABLET, FILM COATED ORAL 3 TIMES DAILY
Qty: 21 TABLET | Refills: 0 | Status: SHIPPED | OUTPATIENT
Start: 2025-01-08 | End: 2025-01-08

## 2025-01-08 RX ORDER — CEPHALEXIN 500 MG/1
500 CAPSULE ORAL EVERY 6 HOURS
Qty: 28 CAPSULE | Refills: 0 | Status: SHIPPED | OUTPATIENT
Start: 2025-01-08 | End: 2025-01-08

## 2025-01-08 RX ORDER — VALACYCLOVIR HYDROCHLORIDE 1 G/1
1000 TABLET, FILM COATED ORAL 3 TIMES DAILY
Qty: 21 TABLET | Refills: 0 | Status: SHIPPED | OUTPATIENT
Start: 2025-01-08 | End: 2025-01-15

## 2025-01-08 RX ORDER — TETRACAINE HYDROCHLORIDE 5 MG/ML
2 SOLUTION OPHTHALMIC
Status: COMPLETED | OUTPATIENT
Start: 2025-01-08 | End: 2025-01-08

## 2025-01-08 RX ORDER — ONDANSETRON 4 MG/1
4 TABLET, ORALLY DISINTEGRATING ORAL EVERY 6 HOURS PRN
Qty: 12 TABLET | Refills: 0 | Status: SHIPPED | OUTPATIENT
Start: 2025-01-08 | End: 2025-01-08

## 2025-01-08 RX ORDER — BUTALBITAL, ACETAMINOPHEN AND CAFFEINE 50; 325; 40 MG/1; MG/1; MG/1
1 TABLET ORAL EVERY 4 HOURS PRN
COMMUNITY
Start: 2025-01-02 | End: 2025-01-09

## 2025-01-08 RX ORDER — FLUTICASONE PROPIONATE AND SALMETEROL 50; 250 UG/1; UG/1
1 POWDER RESPIRATORY (INHALATION) 2 TIMES DAILY
COMMUNITY
Start: 2024-10-25

## 2025-01-08 RX ORDER — CLONIDINE HYDROCHLORIDE 0.1 MG/1
1 TABLET ORAL NIGHTLY
COMMUNITY

## 2025-01-08 RX ORDER — EPINEPHRINE 0.3 MG/.3ML
0.3 INJECTION SUBCUTANEOUS
COMMUNITY
Start: 2025-01-06

## 2025-01-08 RX ORDER — DICLOFENAC POTASSIUM 50 MG/1
50 TABLET, FILM COATED ORAL
COMMUNITY

## 2025-01-08 RX ORDER — CEPHALEXIN 500 MG/1
500 CAPSULE ORAL EVERY 6 HOURS
Qty: 28 CAPSULE | Refills: 0 | Status: SHIPPED | OUTPATIENT
Start: 2025-01-08 | End: 2025-01-15

## 2025-01-08 RX ADMIN — TETRACAINE HYDROCHLORIDE 2 DROP: 5 SOLUTION OPHTHALMIC at 07:01

## 2025-01-08 RX ADMIN — FLUORESCEIN SODIUM 1 EACH: 1 STRIP OPHTHALMIC at 07:01

## 2025-01-08 NOTE — ED PROVIDER NOTES
Encounter Date: 1/8/2025    SCRIBE #1 NOTE: I, Bennie Stone, am scribing for, and in the presence of,  MADINA Barragan. Other sections scribed: HPI, ROS.       History     Chief Complaint   Patient presents with    Eye Problem     Denies pain and blurred vision     CC: Eye problem    HPI: Jensen Bland, a 29 y.o. male presents to the ED with rash beside the right eye that occurred after being struck in the eye by a stick while mowing the lawn 3-4 days ago. Pt reports drainage from the rash. Pt also complains of swelling to the right lower eyelid that began upon waking today. Pt does not have a Hx of similar problem. Pt states that he does wear prescription glasses, but he does not wear contact lenses. Pt denies blurry vision, or double vision.    Patient Active Problem List:     Mild intermittent asthma without complication     Elevated BP without diagnosis of hypertension     Seasonal allergic rhinitis          The history is provided by the patient. No  was used.     Review of patient's allergies indicates:   Allergen Reactions    Peanut Anaphylaxis     Past Medical History:   Diagnosis Date    Asthma     Eczema     Schizo affective schizophrenia      Past Surgical History:   Procedure Laterality Date    colon surgery       Family History   Problem Relation Name Age of Onset    Kidney disease Father      Hypertension Father       Social History     Tobacco Use    Smoking status: Every Day     Current packs/day: 0.50     Types: Cigarettes    Smokeless tobacco: Never   Substance Use Topics    Alcohol use: Not Currently    Drug use: Yes     Types: Marijuana     Review of Systems   Constitutional:  Negative for fever.   HENT:  Negative for congestion, ear discharge, ear pain, facial swelling, rhinorrhea, sore throat and trouble swallowing.    Eyes:  Negative for photophobia, pain and visual disturbance.        (+) R Lower Lid Swelling, Resolved   Gastrointestinal:  Negative for vomiting.    Musculoskeletal:  Negative for neck pain and neck stiffness.   Skin:  Positive for rash. Negative for color change and wound.   Neurological:  Negative for seizures, syncope and weakness.   Psychiatric/Behavioral:  Negative for confusion.        Physical Exam     Initial Vitals [01/08/25 0634]   BP Pulse Resp Temp SpO2   (!) 183/87 85 18 97.5 °F (36.4 °C) 98 %      MAP       --         Physical Exam    Nursing note and vitals reviewed.  Constitutional: He appears well-developed and well-nourished. He is not diaphoretic. He is cooperative.  Non-toxic appearance. He does not have a sickly appearance. He does not appear ill. No distress.   HENT:   Head: Normocephalic and atraumatic.       Right Ear: Tympanic membrane and external ear normal.   Left Ear: Tympanic membrane and external ear normal.   Nose: Nose normal. Mouth/Throat: Mucous membranes are normal. No trismus in the jaw.   No rashes lesions to the ear.  Negative Rodriguez sign.   Eyes: EOM are normal. Pupils are equal, round, and reactive to light. Right conjunctiva is not injected. Right conjunctiva has no hemorrhage. Left conjunctiva is not injected. Left conjunctiva has no hemorrhage. Right eye exhibits normal extraocular motion. Left eye exhibits normal extraocular motion.   Slit lamp exam:       The right eye shows no corneal abrasion, no foreign body and no fluorescein uptake.        The left eye shows no corneal abrasion, no foreign body and no fluorescein uptake.   No fluorescein uptake on exam.  No dendritic lesions.   Neck: Phonation normal.   Normal range of motion.  Pulmonary/Chest: No respiratory distress.   Musculoskeletal:      Cervical back: Normal range of motion.     Neurological: He is alert and oriented to person, place, and time. No sensory deficit. Coordination normal. GCS eye subscore is 4. GCS verbal subscore is 5. GCS motor subscore is 6.   Skin: Skin is warm, dry and intact. Capillary refill takes less than 2 seconds. Rash noted.  No bruising and no laceration noted. No cyanosis or erythema.   Scabbed lesions to the right face. See image below   Psychiatric: He has a normal mood and affect. His speech is normal and behavior is normal. Judgment and thought content normal.         ED Course   Procedures  Labs Reviewed - No data to display       Imaging Results    None          Medications   fluorescein ophthalmic strip 1 each (1 each Both Eyes Given 1/8/25 3894)   TETRAcaine HCl (PF) 0.5 % Drop 2 drop (2 drops Both Eyes Given 1/8/25 0728)     Medical Decision Making  Risk  Prescription drug management.         APC / Resident Notes:   This is an evaluation of a 29 y.o. male that presents to the Emergency Department for rash.  The patient is a non-toxic, afebrile, and well appearing male. On physical exam, there is a grouping of scabbed vesicular appearing lesions to the right face.  No active drainage, no surrounding erythema. No conjunctival injection or strawberry tongue. There are no  oral mucosa lesions, lesions in the webspace's of the fingers, lesions on the palms, desquamation, or sloughing of the skin. No herald patch or satellite lesions. It does not appear fungal.  No fluorescein uptake or dendritic lesions on eye exam.  Vital Signs are Reassuring.     Given the above findings, my overall impression is skin lesions to the face - ?shingles vs  skin infection. Given the above findings, I do not think the patients rash is a result of HFM, Scabies, Meningococcemia, Tinea, Vasculitis, TENs, or SJS.  No findings of Olga Lidia Sebastian or herpes ophthalmicus at this time.    DC Meds:  Given the concern for potential vesicular lesions will cover with Valtrex, will cover with Keflex for skin infection. Additional recommendations:  Return precautions including vision changes or ear rash. The diagnosis, treatment plan, instructions for follow-up and reevaluation with as well as ED return precautions have been discussed and an understanding has been  verbalized. All questions or concerns from the patient have been addressed.  SANDOVAL Nicole FNP-C     Scribe Attestation:   Scribe #1: I performed the above scribed service and the documentation accurately describes the services I performed. I attest to the accuracy of the note.                           I, SANDOVAL Nicole FNP-C, personally performed the services described in this documentation. All medical record entries made by the scribe were at my direction and in my presence. I have reviewed the chart and agree that the record reflects my personal performance and is accurate and complete.      DISCLAIMER: This note was prepared with [employer] voice recognition transcription software. Garbled syntax, mangled pronouns, and other bizarre constructions may be attributed to that software system.      Clinical Impression:  Final diagnoses:  [L98.9] Skin lesion of face (Primary)          ED Disposition Condition    Discharge Stable          ED Prescriptions       Medication Sig Dispense Start Date End Date Auth. Provider    valACYclovir (VALTREX) 1000 MG tablet  (Status: Discontinued) Take 1 tablet (1,000 mg total) by mouth 3 (three) times daily. for 7 days 21 tablet 1/8/2025 1/8/2025 Angelo Kearns, FNP    cephALEXin (KEFLEX) 500 MG capsule  (Status: Discontinued) Take 1 capsule (500 mg total) by mouth every 6 (six) hours. for 7 days 28 capsule 1/8/2025 1/8/2025 Angelo Kearns, FNP    ondansetron (ZOFRAN-ODT) 4 MG TbDL  (Status: Discontinued) Take 1 tablet (4 mg total) by mouth every 6 (six) hours as needed. 12 tablet 1/8/2025 1/8/2025 Angelo Kearns, FNP    cephALEXin (KEFLEX) 500 MG capsule Take 1 capsule (500 mg total) by mouth every 6 (six) hours. for 7 days 28 capsule 1/8/2025 1/15/2025 Angelo Kearns, FNP    ondansetron (ZOFRAN-ODT) 4 MG TbDL Take 1 tablet (4 mg total) by mouth every 6 (six) hours as needed. 12 tablet 1/8/2025 1/15/2025 Angelo Kearns, FNP    valACYclovir (VALTREX) 1000 MG tablet  Take 1 tablet (1,000 mg total) by mouth 3 (three) times daily. for 7 days 21 tablet 1/8/2025 1/15/2025 Angelo Kearns, MADINA          Follow-up Information       Follow up With Specialties Details Why Contact Info    Your Primary Care Doctor  Schedule an appointment as soon as possible for a visit  Please call and schedule an appointment for follow up this week.     [facility] -  Schedule an appointment as soon as possible for a visit  For Follow-Up, This Week, If you do not have a Primary Care Doctor [address] [place] [address]  [phone]      [facility] Emergency Dept Emergency Medicine Go to  If symptoms worsen [address] [facility] [place] [id number] [phone]             Angelo Kearns, MADINA  01/08/25 0745

## 2025-01-08 NOTE — DISCHARGE INSTRUCTIONS
§ Please return to the Emergency Department for any new or worsening symptoms including: fever, chest pain, shortness of breath, loss of consciousness, dizziness, weakness, pain or rash in the ear,  vision problems including but not limited to pain, blurry vision, double vision or any other concerns.     § Schedule an appointment for follow up with your Primary Care Doctor as soon as possible for a recheck of your symptoms. If you do not have one, contact the one listed on your discharge paperwork or call the [facility] Appointment Desk at [phone] to schedule an appointment.     § If you require follow up care from a specialist and are unable to schedule an appointment with them directly, please contact your Primary Care Provider on the next business day to set up a referral.      § Please take all medication as prescribed.  Please take the Keflex and valacyclovir as prescribed.  Zofran as needed for nausea or vomiting.

## 2025-01-08 NOTE — ED NOTES
"Pt to ED today with complaints of R sided eye rash and itching x3 days. Pt denies having any pain or using any drops to help with symptoms. Pt denies working in an environment that he may have gotten something in it. Pt states he has had "clear drainage."   "

## 2025-01-20 ENCOUNTER — HOSPITAL ENCOUNTER (EMERGENCY)
Facility: HOSPITAL | Age: 30
Discharge: HOME | End: 2025-01-20
Attending: EMERGENCY MEDICINE
Payer: COMMERCIAL

## 2025-01-20 VITALS
BODY MASS INDEX: 27.46 KG/M2 | WEIGHT: 155 LBS | RESPIRATION RATE: 18 BRPM | HEART RATE: 96 BPM | TEMPERATURE: 98 F | OXYGEN SATURATION: 100 % | DIASTOLIC BLOOD PRESSURE: 67 MMHG | SYSTOLIC BLOOD PRESSURE: 119 MMHG

## 2025-01-20 DIAGNOSIS — M54.6 THORACIC BACK PAIN: ICD-10-CM

## 2025-01-20 PROCEDURE — 99284 EMERGENCY DEPT VISIT MOD MDM: CPT | Mod: 25

## 2025-01-20 RX ORDER — NAPROXEN 500 MG/1
500 TABLET ORAL 2 TIMES DAILY PRN
Qty: 20 TABLET | Refills: 0 | Status: SHIPPED | OUTPATIENT
Start: 2025-01-20

## 2025-01-20 RX ORDER — METHOCARBAMOL 750 MG/1
750 TABLET, FILM COATED ORAL 3 TIMES DAILY PRN
Qty: 15 TABLET | Refills: 0 | Status: SHIPPED | OUTPATIENT
Start: 2025-01-20 | End: 2025-01-25

## 2025-01-20 RX ORDER — LIDOCAINE 50 MG/G
1 PATCH TOPICAL DAILY
Qty: 20 PATCH | Refills: 0 | Status: SHIPPED | OUTPATIENT
Start: 2025-01-20

## 2025-01-20 NOTE — DISCHARGE INSTRUCTIONS
Please take medications as prescribed for symptoms.  Return if you get worse or if new symptoms develop such as leg or arm weakness or numbness, numbness in your groin, incontinence of urine or stool, or inability to have a bowel movement or to urinate.     Do not take naproxen if you are taking any other NSAID medications, including ibuprofen, naproxen that you already have at home, or diclofenac.

## 2025-01-20 NOTE — ED PROVIDER NOTES
Encounter Date: 1/20/2025       History     Chief Complaint   Patient presents with    Back Pain     Pt reports middle back pain x several days. Pt denies fall, trauma or know injury. Pt denies urinary symptoms.     Chief complaint:  Back pain     History of present illness:  29-year-old male presents emergency department with longstanding history of acute on chronic thoracic and subscapular back pain.  He denies new injury.  States he has had injuries in the past to his coccyx and his left shoulder.   This was a long time ago.  He denies bowel or bladder dysfunction or lower extremity weakness though he states sometimes his legs are limited due to back pain.  He is afebrile.      Review of patient's allergies indicates:   Allergen Reactions    Peanut Anaphylaxis     Past Medical History:   Diagnosis Date    Asthma     Eczema     Schizo affective schizophrenia      Past Surgical History:   Procedure Laterality Date    colon surgery       Family History   Problem Relation Name Age of Onset    Kidney disease Father      Hypertension Father       Social History     Tobacco Use    Smoking status: Every Day     Current packs/day: 0.50     Types: Cigarettes    Smokeless tobacco: Never   Substance Use Topics    Alcohol use: Not Currently    Drug use: Yes     Types: Marijuana     Review of Systems   Constitutional:  Negative for fever.   HENT:  Negative for sore throat.    Respiratory:  Negative for shortness of breath.    Cardiovascular:  Negative for chest pain.   Gastrointestinal:  Negative for nausea.   Genitourinary:  Negative for dysuria.   Musculoskeletal:  Positive for arthralgias and back pain.   Skin:  Negative for rash.   Neurological:  Negative for weakness.   Hematological:  Does not bruise/bleed easily.   Psychiatric/Behavioral:  Negative for behavioral problems and confusion.        Physical Exam     Initial Vitals [01/20/25 1022]   BP Pulse Resp Temp SpO2   116/64 99 18 97.8 °F (36.6 °C) 100 %      MAP        --         Physical Exam    Constitutional: He appears well-developed and well-nourished. He is not diaphoretic. No distress.   HENT:   Head: Normocephalic.   Right Ear: External ear normal.   Left Ear: External ear normal.   Nose: Nose normal. Mouth/Throat: Oropharynx is clear and moist.   Eyes: Conjunctivae and EOM are normal. Pupils are equal, round, and reactive to light. Right eye exhibits no discharge. Left eye exhibits no discharge. No scleral icterus.   Neck: Neck supple. No JVD present.   Normal range of motion.  Cardiovascular:  Normal rate, regular rhythm, normal heart sounds and intact distal pulses.     Exam reveals no gallop and no friction rub.       No murmur heard.  Pulmonary/Chest: Breath sounds normal. No stridor. No respiratory distress. He has no wheezes. He has no rhonchi. He has no rales. He exhibits no tenderness.   Abdominal: He exhibits no distension and no mass. There is no abdominal tenderness. There is no rebound and no guarding.   Musculoskeletal:         General: No tenderness or edema. Normal range of motion.      Cervical back: Normal range of motion and neck supple.      Comments: There is tenderness in the midthoracic area without step-offs or deformities.  There is also tenderness in the subscapular areas bilaterally.  Patient has good strength and sensation intact in all 4 extremities.  There are no rashes.  There is also tenderness over the glenohumeral joint of the left shoulder, which the patient states has been a longstanding injury.     Neurological: He is alert and oriented to person, place, and time. He has normal strength. No cranial nerve deficit or sensory deficit.   Skin: Skin is warm and dry. No rash noted. No erythema. No pallor.   Psychiatric: He has a normal mood and affect. His behavior is normal. Judgment and thought content normal.         ED Course   Procedures  Labs Reviewed - No data to display       Imaging Results              X-Ray Thoracic Spine AP  Lateral (Final result)  Result time 01/20/25 11:49:26      Final result by Jason Long MD (01/20/25 11:49:26)                   Impression:      No acute compression fracture deformity identified in the thoracic spine.      Electronically signed by: Jason Long MD  Date:    01/20/2025  Time:    11:49               Narrative:    EXAMINATION:  XR THORACIC SPINE AP LATERAL    CLINICAL HISTORY:  Pain in thoracic spine    TECHNIQUE:  AP and lateral views of the thoracic spine were performed.    COMPARISON:  None    FINDINGS:  Grossly normal sagittal curvature and alignment.  Vertebral body heights are relatively well maintained.  No acute displaced fracture.                                       X-Ray Shoulder 2 or More Views Left (Final result)  Result time 01/20/25 11:50:11      Final result by Jason Long MD (01/20/25 11:50:11)                   Impression:      No acute displaced fracture.      Electronically signed by: Jason Long MD  Date:    01/20/2025  Time:    11:50               Narrative:    EXAMINATION:  XR SHOULDER COMPLETE 2 OR MORE VIEWS LEFT    CLINICAL HISTORY:  left shoulder pain;    TECHNIQUE:  Two views of the left shoulder were performed.    COMPARISON:  None    FINDINGS:  No acute displaced fracture.  No dislocation.  Soft tissues are symmetric.  No unexpected radiopaque foreign body.                                    X-Rays:   Independently Interpreted Readings:   Other Readings:  X-rays of the left shoulder and thoracic spine reveal no acute bony abnormalities.    Medications - No data to display  Medical Decision Making  This is the emergent evaluation of a 29-year-old male presents emergency department for evaluation of acute on chronic thoracic back pain and left shoulder pain.  Differential diagnosis at the time of initial evaluation included, but was not limited to:  Strain, sprain, arthritis.  Patient is requesting x-rays of his shoulder and back.  I have ordered  these though I believe this is likely either arthritis or muscle strain.  No findings of concern for cord compression.       X-rays of the left shoulder and left thoracic spine reveal no acute bony abnormalities.  Patient is safe and stable for discharge with symptomatic treatment and follow up as an outpatient.  Advised return for new or worsening symptoms such as bowel or bladder symptoms or lower extremity weakness or numbness.    Amount and/or Complexity of Data Reviewed  Radiology: ordered and independent interpretation performed. Decision-making details documented in ED Course.    Risk  Prescription drug management.                                      Clinical Impression:  Final diagnoses:  [M54.6] Thoracic back pain          ED Disposition Condition    Discharge Stable          ED Prescriptions       Medication Sig Dispense Start Date End Date Auth. Provider    naproxen (NAPROSYN) 500 MG tablet Take 1 tablet (500 mg total) by mouth 2 (two) times daily as needed (back pain). 20 tablet 1/20/2025 -- Oswald Acevedo Jr., MD    methocarbamoL (ROBAXIN) 750 MG Tab Take 1 tablet (750 mg total) by mouth 3 (three) times daily as needed (back pain). 15 tablet 1/20/2025 1/25/2025 Oswald Acevedo Jr., MD    LIDOcaine (LIDODERM) 5 % Place 1 patch onto the skin once daily. Remove & Discard patch within 12 hours or as directed by MD 20 patch 1/20/2025 -- Oswald Acevedo Jr., MD          Follow-up Information    None          Oswald Acevedo Jr., MD  01/20/25 3337

## 2025-03-01 ENCOUNTER — HOSPITAL ENCOUNTER (EMERGENCY)
Facility: HOSPITAL | Age: 30
Discharge: HOME | End: 2025-03-01
Attending: STUDENT IN AN ORGANIZED HEALTH CARE EDUCATION/TRAINING PROGRAM
Payer: COMMERCIAL

## 2025-03-01 VITALS
TEMPERATURE: 99 F | BODY MASS INDEX: 28.17 KG/M2 | OXYGEN SATURATION: 97 % | SYSTOLIC BLOOD PRESSURE: 138 MMHG | DIASTOLIC BLOOD PRESSURE: 74 MMHG | WEIGHT: 159 LBS | HEART RATE: 87 BPM | HEIGHT: 63 IN | RESPIRATION RATE: 14 BRPM

## 2025-03-01 DIAGNOSIS — G89.29 CHRONIC LOW BACK PAIN, UNSPECIFIED BACK PAIN LATERALITY, UNSPECIFIED WHETHER SCIATICA PRESENT: ICD-10-CM

## 2025-03-01 DIAGNOSIS — J02.9 SORE THROAT: Primary | ICD-10-CM

## 2025-03-01 DIAGNOSIS — M54.50 CHRONIC LOW BACK PAIN, UNSPECIFIED BACK PAIN LATERALITY, UNSPECIFIED WHETHER SCIATICA PRESENT: ICD-10-CM

## 2025-03-01 LAB
CTP QC/QA: YES
MOLECULAR STREP A: NEGATIVE

## 2025-03-01 PROCEDURE — 87651 STREP A DNA AMP PROBE: CPT

## 2025-03-01 PROCEDURE — 96372 THER/PROPH/DIAG INJ SC/IM: CPT

## 2025-03-01 PROCEDURE — 63600175 PHARM REV CODE 636 W HCPCS: Mod: JZ,TB

## 2025-03-01 PROCEDURE — 99284 EMERGENCY DEPT VISIT MOD MDM: CPT | Mod: 25

## 2025-03-01 RX ORDER — METHOCARBAMOL 500 MG/1
1000 TABLET, FILM COATED ORAL 3 TIMES DAILY
Qty: 30 TABLET | Refills: 0 | Status: SHIPPED | OUTPATIENT
Start: 2025-03-01 | End: 2025-03-06

## 2025-03-01 RX ORDER — KETOROLAC TROMETHAMINE 10 MG/1
10 TABLET, FILM COATED ORAL EVERY 6 HOURS
Qty: 20 TABLET | Refills: 0 | Status: SHIPPED | OUTPATIENT
Start: 2025-03-01 | End: 2025-03-06

## 2025-03-01 RX ORDER — KETOROLAC TROMETHAMINE 30 MG/ML
15 INJECTION, SOLUTION INTRAMUSCULAR; INTRAVENOUS
Status: COMPLETED | OUTPATIENT
Start: 2025-03-01 | End: 2025-03-01

## 2025-03-01 RX ORDER — ACETAMINOPHEN 500 MG
500 TABLET ORAL EVERY 6 HOURS PRN
Qty: 30 TABLET | Refills: 0 | Status: SHIPPED | OUTPATIENT
Start: 2025-03-01

## 2025-03-01 RX ADMIN — KETOROLAC TROMETHAMINE 15 MG: 30 INJECTION, SOLUTION INTRAMUSCULAR at 11:03

## 2025-03-02 NOTE — ED PROVIDER NOTES
Encounter Date: 3/1/2025       History     Chief Complaint   Patient presents with    Sore Throat    Tailbone Pain     Pt presents w/ a sore throat that started today and tailbone pain that is chronic. Denies N/V/D, fever, chills, body aches.      Patient is a 21-year-old male past medical history of asthma, eczema, schizoaffective is presenting for evaluation of sore throat. Patient reports he started to have a sore throat today. Denies fever, cough, abdominal pain, or vomiting. Patient denies taking treatment for his symptoms.  Patient also reports pain to his coccyx.  Reports pain has been going on for a while due to past injury.         Review of patient's allergies indicates:   Allergen Reactions    Peanut Anaphylaxis     Past Medical History:   Diagnosis Date    Asthma     Eczema     Schizo affective schizophrenia      Past Surgical History:   Procedure Laterality Date    colon surgery       Family History   Problem Relation Name Age of Onset    Kidney disease Father      Hypertension Father       Social History[1]  Review of Systems   Constitutional:  Negative for fever.   HENT:  Negative for congestion and sore throat.    Gastrointestinal:  Negative for diarrhea, nausea and vomiting.   Musculoskeletal:  Positive for back pain.       Physical Exam     Initial Vitals [03/01/25 2235]   BP Pulse Resp Temp SpO2   138/74 87 14 98.6 °F (37 °C) 97 %      MAP       --         Physical Exam    Constitutional: He appears well-developed and well-nourished. He is not diaphoretic. No distress.   HENT:   Head: Normocephalic and atraumatic.   Right Ear: External ear normal.   Left Ear: External ear normal.   Nose: Nose normal. Mouth/Throat: Oropharynx is clear and moist. No oropharyngeal exudate.   Eyes: Conjunctivae and EOM are normal. Right eye exhibits no discharge. Left eye exhibits no discharge.   Neck:   Normal range of motion.  Pulmonary/Chest: No respiratory distress.   Abdominal: Abdomen is soft. He exhibits no  distension. There is no abdominal tenderness. There is no rebound and no guarding.   Musculoskeletal:         General: No tenderness or edema. Normal range of motion.      Cervical back: Normal range of motion. No tenderness or bony tenderness.      Thoracic back: No tenderness. Normal range of motion.      Lumbar back: No tenderness or bony tenderness. Normal range of motion.     Neurological: He is alert and oriented to person, place, and time. He has normal strength.   Skin: Skin is warm and dry. Capillary refill takes less than 2 seconds.   Psychiatric: He has a normal mood and affect.         ED Course   Procedures  Labs Reviewed   POCT STREP A MOLECULAR       Result Value    Molecular Strep A, POC Negative       Acceptable Yes            Imaging Results    None          Medications   ketorolac injection 15 mg (15 mg Intramuscular Given 3/1/25 2300)     Medical Decision Making  Patient is a 21-year-old male past medical history of asthma, eczema, schizoaffective is presenting for evaluation of sore throat.    Differential includes but not limited to strep pharyngitis, viral pharyngitis, viral URI, peritonsillar abscess, back sprain, cauda equina however less likely due to lack of leg weakness, leg numbness.  Per chart review patient has been having low back pain since 2019 and was following with his PCP.     Patient is alert and afebrile.  Patient is nontoxic appearing, not in distress.  Vitals within normal limits.  On physical exam patient ambulating without difficulty.  Lungs are clear to auscultation.  Patient is speaking in full sentences without difficulty.  Normal heart rate and rhythm.  No posterior oropharyngeal erythema, tonsillar swelling, tonsillar exudates.  Uvula is midline.  Abdomen is soft and nondistended.  No abdominal tenderness rebound or guarding.  Negative McBurney's or Houser's sign.  No midline spinal tenderness.  No paraspinal tightness.  Normal range of motion of back.   Normal strength and sensation to lower extremities.  No signs of fluid overload.  No leg edema.  Strong distal radial pulse bilaterally.  No neurological deficits noted.    Strep a is negative.  Discussed with patient I have symptomatic management for chronic back pain.  Recommended warm salt-water gargles for sore throat.  Discussed taking Tylenol or ibuprofen as directed for pain.  Recommended follow up with PCP in 2 days.  Discussed with patient discussing chronic pain with his PCP.  Discussed taking muscle relaxer for pain.  Return precautions given for new or worsening symptoms.  Patient is in agreeance to this plan, expresses understanding return precautions and follow up plan.  Vitals are reassuring.  Patient is safe for discharge at this time.      Amount and/or Complexity of Data Reviewed  Labs: ordered.    Risk  OTC drugs.  Prescription drug management.                                      Clinical Impression:  Final diagnoses:  [J02.9] Sore throat (Primary)  [M54.50, G89.29] Chronic low back pain, unspecified back pain laterality, unspecified whether sciatica present          ED Disposition Condition    Discharge Stable          ED Prescriptions       Medication Sig Dispense Start Date End Date Auth. Provider    acetaminophen (TYLENOL) 500 MG tablet Take 1 tablet (500 mg total) by mouth every 6 (six) hours as needed for Pain. 30 tablet 3/1/2025 -- Ashli Dick PA-C    ketorolac (TORADOL) 10 mg tablet Take 1 tablet (10 mg total) by mouth every 6 (six) hours. for 5 days 20 tablet 3/1/2025 3/6/2025 Ashli Dick PA-C    methocarbamoL (ROBAXIN) 500 MG Tab Take 2 tablets (1,000 mg total) by mouth 3 (three) times daily. for 5 days 30 tablet 3/1/2025 3/6/2025 Ashli Dick PA-C          Follow-up Information       Follow up With Specialties Details Why Contact Info    Abdoul Garcia MD Family Medicine Schedule an appointment as soon as possible for a visit in 2 days For follow up [place]ret [place]  [facility] [address]  [place] [address]  [phone]      [facility] Emergency Medicine Go to  If new symptoms develop or symptoms worsen [address] [facility] [place] [address] [phone]               [1]   Social History  Tobacco Use    Smoking status: Every Day     Current packs/day: 0.50     Types: Cigarettes    Smokeless tobacco: Never   Substance Use Topics    Alcohol use: Not Currently    Drug use: Yes     Types: Marijuana        Ashli Dick PA-C  03/01/25 0021

## 2025-03-02 NOTE — DISCHARGE INSTRUCTIONS
Do not operate heavy machinery or drive a vehicle while taking muscle relaxer due to potential drowsiness.    Thank you for coming to our Emergency Department today. It is important to remember that some problems or medical conditions are difficult to diagnose and may not be found or addressed during your Emergency Department visit.  These conditions often start with non-specific symptoms and can only be diagnosed on follow up visits with your primary care physician or specialist when the symptoms continue or change. Please remember that all medical conditions can change, and we cannot predict how you will be feeling tomorrow or the next day. Return to the ER with any questions/concerns, new/concerning symptoms, worsening or failure to improve.       Be sure to follow up with your primary care doctor and review all labs/imaging/tests that were performed during your ER visit with them. It is very common for us to identify non-emergent incidental findings which must be followed up with your primary care physician.  Some labs/imaging/tests may be outside of the normal range, and require non-emergent follow-up and/or further investigation/treatment/procedures/testing to help diagnose/exclude/prevent complications or other potentially serious medical conditions. Some abnormalities may not have been discussed or addressed during your ER visit.     An ER visit does not replace a primary care visit, and many screening tests or follow-up tests cannot be ordered by an ER doctor or performed by the ER. Some tests may even require pre-approval.    If you do not have a primary care doctor, you may contact the one listed on your discharge paperwork or you may also call the [facility] Appointment Desk at [phone] , or [address] [place] [address] at  [phone] to schedule an appointment, or establish care with a primary care doctor or even a specialist and to obtain information about local resources. It is important to your health  that you have a primary care doctor.    Please take all medications as directed. We have done our best to select a medication for you that will treat your condition however, all medications may potentially have side-effects and it is impossible to predict which medications may give you side-effects or what those side-effects (if any) those medications may give you.  If you feel that you are having a negative effect or side-effect of any medication you should stop taking those medications immediately and seek medical attention. If you feel that you are having a life-threatening reaction call 911.        Do not drive, swim, climb to height, take a bath, operate heavy machinery, drink alcohol or take potentially sedating medications, sign any legal documents or make any important decisions for 24 hours if you have received any pain medications, sedatives or mood altering drugs during your ER visit or within 24 hours of taking them if they have been prescribed to you.     You can find additional resources for Dentists, hearing aids, durable medical equipment, low cost pharmacies and other resources at [email or web address]

## 2025-03-14 ENCOUNTER — HOSPITAL ENCOUNTER (EMERGENCY)
Facility: HOSPITAL | Age: 30
Discharge: HOME | End: 2025-03-14
Attending: EMERGENCY MEDICINE
Payer: MEDICAID

## 2025-03-14 VITALS
TEMPERATURE: 98 F | WEIGHT: 159 LBS | OXYGEN SATURATION: 99 % | HEART RATE: 90 BPM | RESPIRATION RATE: 18 BRPM | SYSTOLIC BLOOD PRESSURE: 119 MMHG | BODY MASS INDEX: 28.17 KG/M2 | DIASTOLIC BLOOD PRESSURE: 76 MMHG

## 2025-03-14 DIAGNOSIS — M25.572 LEFT ANKLE PAIN: ICD-10-CM

## 2025-03-14 DIAGNOSIS — S93.402A SPRAIN OF LEFT ANKLE, UNSPECIFIED LIGAMENT, INITIAL ENCOUNTER: Primary | ICD-10-CM

## 2025-03-14 PROCEDURE — 99283 EMERGENCY DEPT VISIT LOW MDM: CPT | Mod: 25

## 2025-03-14 PROCEDURE — 25000003 PHARM REV CODE 250

## 2025-03-14 RX ORDER — IBUPROFEN 600 MG/1
600 TABLET ORAL
Status: COMPLETED | OUTPATIENT
Start: 2025-03-14 | End: 2025-03-14

## 2025-03-14 RX ADMIN — IBUPROFEN 600 MG: 600 TABLET, FILM COATED ORAL at 10:03

## 2025-03-14 NOTE — ED PROVIDER NOTES
Encounter Date: 3/14/2025    SCRIBE #1 NOTE: IMercedes, am scribing for, and in the presence of,  Humera Rodriguez PA-C. I have scribed the following portions of the note - Other sections scribed: HPI, ROS.       History     Chief Complaint   Patient presents with    Ankle Pain     Pt c/o left ankle pain after being injured while playing basketball 2 days ago. Pt ambulatory and weight bearing. No pain medications PTA      Jensen Bland is a 29 y.o. male, with a PMHx of Asthma, Eczema, and Schizo affective schizophrenia, who presents to the ED with left ankle pain and swelling that began 2x days ago. Patient reports that he was playing basketball when he rotated his left ankle. He states that he is able to ambulate, but unable to put much pressure on the left foot. The symptoms are acute, constant and severe (10/10). Of note, he reports that he has injured the ankle in the past playing football and he also notes history of a left broken fibula. Denies any other associated symptoms. No treatment attempted PTA to the ED. No other exacerbating or alleviating factors.      The history is provided by the patient.     Review of patient's allergies indicates:   Allergen Reactions    Peanut Anaphylaxis     Past Medical History:   Diagnosis Date    Asthma     Eczema     Schizo affective schizophrenia      Past Surgical History:   Procedure Laterality Date    colon surgery       Family History   Problem Relation Name Age of Onset    Kidney disease Father      Hypertension Father       Social History[1]  Review of Systems   Constitutional:  Negative for fever.   HENT:  Negative for sore throat.    Respiratory:  Negative for shortness of breath.    Cardiovascular:  Negative for chest pain.   Gastrointestinal:  Negative for nausea.   Genitourinary:  Negative for dysuria.   Musculoskeletal:  Positive for arthralgias (left ankle pain) and joint swelling (left ankle). Negative for back pain.   Skin:  Negative for rash.    Neurological:  Negative for weakness.       Physical Exam     Initial Vitals [03/14/25 0952]   BP Pulse Resp Temp SpO2   119/76 90 18 98.4 °F (36.9 °C) 99 %      MAP       --         Physical Exam    Nursing note and vitals reviewed.  Constitutional: He appears well-developed and well-nourished. No distress.   HENT:   Head: Normocephalic and atraumatic.   Eyes: Conjunctivae are normal.   Pulmonary/Chest: No respiratory distress.   Musculoskeletal:         General: Normal range of motion.      Left ankle: No swelling or deformity. Tenderness present over the lateral malleolus, ATF ligament and CF ligament. Normal range of motion. Normal pulse.      Left Achilles Tendon: Normal.      Comments: Tenderness to palpation of ATFL and CFL on the left ankle.  No swelling noted.  Patient has full range motion of ankle joint with minimal pain.  Patient able to weight bear with minimal pain.  2+ DP/PT pulses.  Sensation intact.     Neurological: He is alert.   Skin: Skin is warm and dry. No rash noted.   Psychiatric: He has a normal mood and affect. His behavior is normal. Judgment and thought content normal.         ED Course   Procedures  Labs Reviewed - No data to display       Imaging Results              X-Ray Ankle Complete Left (Final result)  Result time 03/14/25 11:29:05      Final result by Hill Oglesby MD (03/14/25 11:29:05)                   Impression:      No acute findings.      Electronically signed by: Hill Oglesby MD  Date:    03/14/2025  Time:    11:29               Narrative:    EXAMINATION:  XR ANKLE COMPLETE 3 VIEW LEFT    CLINICAL HISTORY:  Pain in left ankle and joints of left foot    TECHNIQUE:  AP, lateral and oblique views of the left ankle were performed.    COMPARISON:  None    FINDINGS:  Ankle mortise intact.  Dystrophic calcification distal to the medial malleolus and in lower region of the lower tib-fib ligament.  No fractures.  No marrow replacement process.                                        Medications   ibuprofen tablet 600 mg (600 mg Oral Given 3/14/25 4323)     Medical Decision Making  29-year-old male presents secondary to left ankle pain.  Differential diagnosis includes but isn't limited to:Fracture, dislocation, hemarthrosis, septic joint, cellulitis, bursitis, muscle strain, ligament tear/sprain, abrasion, soft tissue contusion, osteoarthritis, osteomyelitis.     Vital signs reassuring.  Patient in no acute distress.  Patient has mild tenderness to the ATFL and CFL on the right side.  No joint swelling noted.  Patient has full range motion of ankle.  Patient able to weight bear with minimal pain.  X-ray of the ankle showed no acute fracture dislocations.  Patient given Motrin with improvement of pain.  Instructed patient on RICE.  Blood Ace wrap to ankle and provided patient with crutches.  We discussed strict return precautions.  Patient is understanding and agreeable to treatment plan.  Instructed to follow up with PCP in the next 2-3 days.      Amount and/or Complexity of Data Reviewed  Radiology: ordered.    Risk  Prescription drug management.            Scribe Attestation:   Scribe #1: I performed the above scribed service and the documentation accurately describes the services I performed. I attest to the accuracy of the note.                         I, Humera Rodriguez PA-C, personally performed the services described in this documentation. All medical record entries made by the scribe were at my direction and in my presence. I have reviewed the chart and agree that the record reflects my personal performance and is accurate and complete.       Clinical Impression:  Final diagnoses:  [M25.572] Left ankle pain  [S93.402A] Sprain of left ankle, unspecified ligament, initial encounter (Primary)          ED Disposition Condition    Discharge Stable          ED Prescriptions    None       Follow-up Information       Follow up With Specialties Details Why Contact Info    Abdoul Garcia  MD [facility] Family Medicine Schedule an appointment as soon as possible for a visit in 3 days for follow up [address]  [address]  [place] [address]  [phone]      [facility] Emergency [facility] Emergency Medicine Go to  If symptoms worsen, As needed, shortness of breath, chest pain, fever, worsening cough, nausea, vomiting, abdominal pain [address] [facility] [place] [address] [phone]    [facility] Orthopedics Schedule an appointment as soon as possible for a visit in 1 week  [address] [facility] [place] [address] [phone]               [1]   Social History  Tobacco Use    Smoking status: Every Day     Current packs/day: 0.50     Types: Cigarettes    Smokeless tobacco: Never   Substance Use Topics    Alcohol use: Not Currently    Drug use: Yes     Types: Marijuana        Humera Rodriguez PA-C  03/14/25 1373

## 2025-03-14 NOTE — DISCHARGE INSTRUCTIONS
Problem Specific Instructions:  Any bone/joint/muscle injury, regardless of broken bones or not may take between 4-6 weeks to heal.  You may ice it throughout the day, compress it with something like an Ace wrap or compression sleeve and elevate it above your heart regularly to prevent or reduce swelling. If you are not improving in that time, follow-up with your primary care provider or orthopedics for re-evaluation. Return to the Emergency Department if you experience worsening pain, numbness, tingling, change of color in the body part, or any other concerning symptoms.     If you would like to follow up with the [facility] for further care of your fracture, please call the  Scheduling Department at [phone] during business hours. Please let the  know you need a fracture follow-up appointment with Orthopedics, and you will be scheduled in the Orthopedic Clinic. Please bring your original Emergency Department discharge papers and disc with you to the clinic appointment.     If you received or are discharged with pain medicine or muscle relaxers, understand that they can make you sleepy or impair your judgement. Do not make important decisions, drink, drive, swim or perform any other tasks you would not otherwise perform while impaired for at least 24 hours after your last dose.      Ensure you follow up with your Primary Care Provider or any additional providers listed on this discharge sheet. While you may be healthy enough to go home today, I cannot predict the exact course of your diagnoses. It is important to remember that some problems are difficult to diagnose and may not be found during your first visit. As such, it is your responsibility to monitor symptoms, follow-up with another healthcare provider, or return to the emergency room for new or worsening concerns. Unless otherwise instructed, continue all home medications and any new medications prescribed to  you in the Emergency Department.

## 2025-03-27 ENCOUNTER — HOSPITAL ENCOUNTER (EMERGENCY)
Facility: HOSPITAL | Age: 30
Discharge: HOME | End: 2025-03-27
Attending: EMERGENCY MEDICINE
Payer: MEDICAID

## 2025-03-27 VITALS
OXYGEN SATURATION: 98 % | DIASTOLIC BLOOD PRESSURE: 90 MMHG | BODY MASS INDEX: 28.17 KG/M2 | HEIGHT: 63 IN | WEIGHT: 159 LBS | TEMPERATURE: 98 F | HEART RATE: 90 BPM | RESPIRATION RATE: 20 BRPM | SYSTOLIC BLOOD PRESSURE: 143 MMHG

## 2025-03-27 DIAGNOSIS — S31.20XA: Primary | ICD-10-CM

## 2025-03-27 PROCEDURE — 99283 EMERGENCY DEPT VISIT LOW MDM: CPT

## 2025-03-27 RX ORDER — MUPIROCIN 20 MG/G
OINTMENT TOPICAL 2 TIMES DAILY
Qty: 22 G | Refills: 0 | Status: ON HOLD | OUTPATIENT
Start: 2025-03-27

## 2025-03-27 NOTE — ED PROVIDER NOTES
"Encounter Date: 3/27/2025    SCRIBE #1 NOTE: I, Esperanza Lemons am scribing for, and in the presence of,  Carla Toure PA-C. I have scribed the following portions of the note - Other sections scribed: HPI, ROS, PE.       History     Chief Complaint   Patient presents with    Laceration     Pt arrived to the ED due to laceration to scrotum that pt noticed 2-3 days ago after placing a "band" on scrotum      29 y.o. male with PMHx of schizophrenia presents for emergent evaluation of wound check to the penis he noticed 3-5 days ago. Patient reports wrapping his genitals in a bandage for "protection" from various potential exposures/trauma on a daily basis and believes that bandage continually rubbing on the area caused the wound.  He noticed tenderness in the areas several days ago with some clear drainage prompting him to report for further evaluation.  He attempted treatment with topical cortisone ointment without significant improvement.  He states he has continually worn the wrap because this is something he does daily for "a long time".  He states he does not do this for pleasure but to protect his genitals from the world.  He was the bandage and then his underwear on top.  He denies any enlargement of the wound, purulent drainage, surrounding redness or swelling, testicle pain or swelling, dysuria, or any other penile lesion/drainage.  He is tolerating PO without difficulty.  He denies fever/chills, abdominal pain, nausea/vomiting/diarrhea, or any other complaints at this time.      The history is provided by the patient. No  was used.     Review of patient's allergies indicates:   Allergen Reactions    Peanut Anaphylaxis     Past Medical History:   Diagnosis Date    Asthma     Eczema     Schizo affective schizophrenia      Past Surgical History:   Procedure Laterality Date    colon surgery       Family History   Problem Relation Name Age of Onset    Kidney disease Father      Hypertension " Father       Social History[1]  Review of Systems   Constitutional:  Negative for chills and fever.   HENT:  Negative for sore throat.    Respiratory:  Negative for shortness of breath.    Cardiovascular:  Negative for chest pain.   Gastrointestinal:  Negative for abdominal pain, diarrhea, nausea and vomiting.   Genitourinary:  Negative for decreased urine volume, dysuria, hematuria and testicular pain.   Musculoskeletal:  Negative for myalgias.   Skin:  Positive for wound. Negative for rash.   Neurological:  Negative for weakness and headaches.   Psychiatric/Behavioral: Negative.         Physical Exam     Initial Vitals [03/27/25 0652]   BP Pulse Resp Temp SpO2   (!) 143/90 90 20 98 °F (36.7 °C) 98 %      MAP       --         Physical Exam    Nursing note and vitals reviewed.  Constitutional: He appears well-developed and well-nourished. He is not diaphoretic. No distress.   HENT:   Head: Normocephalic and atraumatic.   Right Ear: External ear normal.   Left Ear: External ear normal.   Eyes: Conjunctivae and EOM are normal.   Neck: Neck supple.   Normal range of motion.  Pulmonary/Chest: No stridor. No respiratory distress.   Genitourinary: No discharge found.    Genitourinary Comments: Exam chaperoned by Linsey Bland RN. 1.5cm well healing superficial wound at the 6 o'clock position at base the penis. No drainage, surrounding erythema, or cellulitic appearance.        Musculoskeletal:         General: Normal range of motion.      Cervical back: Normal range of motion and neck supple.     Neurological: He is alert and oriented to person, place, and time.   Skin: No rash noted.   Psychiatric: He has a normal mood and affect. Thought content normal.         ED Course   Procedures  Labs Reviewed - No data to display       Imaging Results    None          Medications - No data to display  Medical Decision Making  This is an evaluation of a 29 y.o. male that presents to the Emergency Department for a wound check to  base of penis. Physical exam reveals a nontoxic and well appearing male. Patient is afebrile vital signs are stable. Wound exam reveals a well-healing linear superficial abrasion to the penis with no surrounding erythema or induration noted. No purulent discharge expressed. The wound appears to be healing well with no signs of cellulitis, infection, or failure of outpatient management. Vital signs reassuring.     Advised on supportive care and recommend f/u with PCP for wound check in 3-5 days.  Recommend he avoid any tight constrictive clothing/wrapping that we will further irritate or rub in the area as this is going to lay appropriate healing.  He may keep it covered with nonstick adhesive. Strict return precautions discussed.    The diagnosis, treatment plan, instructions for follow-up and reevaluation with his PCP as well as ED return precautions have been discussed and understanding of the information has been verbalized. All questions or concerns have been addressed.      Amount and/or Complexity of Data Reviewed  External Data Reviewed: labs and notes.    Risk  OTC drugs.  Prescription drug management.  Diagnosis or treatment significantly limited by social determinants of health.            Scribe Attestation:   Scribe #1: I performed the above scribed service and the documentation accurately describes the services I performed. I attest to the accuracy of the note.                           I, LEANNA Toure, Emergency Medicine PA-C, personally performed the services described in this documentation. All medical record entries made by the scribe were at my direction and in my presence. I have reviewed the chart and agree that the record reflects my personal performance and is accurate and complete.      DISCLAIMER: This note was prepared with knowNormal voice recognition transcription software. Garbled syntax, mangled pronouns, and other bizarre constructions may be attributed to that software system.     Clinical  Impression:  Final diagnoses:  [S31.20XA] Open wound of penis without complication, initial encounter (Primary)          ED Disposition Condition    Discharge Stable          ED Prescriptions       Medication Sig Dispense Start Date End Date Auth. Provider    mupirocin (BACTROBAN) 2 % ointment Apply topically 2 (two) times daily. 22 g 3/27/2025 -- Carla Toure PA-C          Follow-up Information       Follow up With Specialties Details Why Contact Info    [facility] - Emergency Dept Emergency Medicine Go to  For new or worsening symptoms [address] [facility] [place] [id number] [phone]    Abdoul Garcia MD Family Medicine   [address] [address]  [phone]                 Carla Toure PA-C  03/28/25 0047         [1]   Social History  Tobacco Use    Smoking status: Every Day     Current packs/day: 0.50     Types: Cigarettes    Smokeless tobacco: Never   Substance Use Topics    Alcohol use: Not Currently    Drug use: Yes     Types: Marijuana        Carla Toure PA-C  03/29/25 0745

## 2025-03-27 NOTE — ED NOTES
Assumed care of a 28 y/o male who report to the ED due to a cut on the scrotum area due to wrapping him self with an ace bandage. Bleeding is controlled.

## 2025-03-30 ENCOUNTER — HOSPITAL ENCOUNTER (EMERGENCY)
Facility: HOSPITAL | Age: 30
Discharge: PSYCHIATRIC OR SUBSTANCE USE FACILITY | End: 2025-03-30
Attending: EMERGENCY MEDICINE
Payer: MEDICAID

## 2025-03-30 VITALS
HEIGHT: 63 IN | BODY MASS INDEX: 28.17 KG/M2 | OXYGEN SATURATION: 97 % | HEART RATE: 90 BPM | DIASTOLIC BLOOD PRESSURE: 64 MMHG | TEMPERATURE: 98 F | SYSTOLIC BLOOD PRESSURE: 117 MMHG | WEIGHT: 159 LBS | RESPIRATION RATE: 19 BRPM

## 2025-03-30 DIAGNOSIS — R45.1 AGITATION: Primary | ICD-10-CM

## 2025-03-30 LAB
ABSOLUTE NEUTROPHIL MANUAL (OHS): 5 K/UL
ALBUMIN SERPL BCP-MCNC: 4.7 G/DL (ref 3.5–5.2)
ALP SERPL-CCNC: 83 UNIT/L (ref 40–150)
ALT SERPL W/O P-5'-P-CCNC: 28 UNIT/L (ref 10–44)
AMPHET UR QL SCN: NEGATIVE
ANION GAP (OHS): 12 MMOL/L (ref 8–16)
APAP SERPL-MCNC: <3 UG/ML (ref 10–20)
AST SERPL-CCNC: 34 UNIT/L (ref 11–45)
BACTERIA #/AREA URNS AUTO: ABNORMAL /HPF
BARBITURATE SCN PRESENT UR: NEGATIVE
BENZODIAZ UR QL SCN: NEGATIVE
BILIRUB SERPL-MCNC: 0.6 MG/DL (ref 0.1–1)
BILIRUB UR QL STRIP.AUTO: NEGATIVE
BUN SERPL-MCNC: 12 MG/DL (ref 6–20)
CALCIUM SERPL-MCNC: 10.2 MG/DL (ref 8.7–10.5)
CANNABINOIDS UR QL SCN: ABNORMAL
CHLORIDE SERPL-SCNC: 103 MMOL/L (ref 95–110)
CLARITY UR: CLEAR
CO2 SERPL-SCNC: 22 MMOL/L (ref 23–29)
COCAINE UR QL SCN: NEGATIVE
COLOR UR AUTO: YELLOW
CREAT SERPL-MCNC: 1 MG/DL (ref 0.5–1.4)
CREAT UR-MCNC: >450 MG/DL (ref 23–375)
EOSINOPHIL NFR BLD MANUAL: 9 % (ref 0–8)
ERYTHROCYTE [DISTWIDTH] IN BLOOD BY AUTOMATED COUNT: 13.9 % (ref 11.5–14.5)
ETHANOL SERPL-MCNC: <10 MG/DL
GFR SERPLBLD CREATININE-BSD FMLA CKD-EPI: >60 ML/MIN/1.73/M2
GLUCOSE SERPL-MCNC: 100 MG/DL (ref 70–110)
GLUCOSE UR QL STRIP: NEGATIVE
HCT VFR BLD AUTO: 45.4 % (ref 40–54)
HGB BLD-MCNC: 15.2 GM/DL (ref 14–18)
HGB UR QL STRIP: NEGATIVE
HYALINE CASTS UR QL AUTO: 4 /LPF (ref 0–1)
KETONES UR QL STRIP: NEGATIVE
LEUKOCYTE ESTERASE UR QL STRIP: ABNORMAL
LYMPHOCYTES NFR BLD MANUAL: 41 % (ref 18–48)
MCH RBC QN AUTO: 30.6 PG (ref 27–50)
MCHC RBC AUTO-ENTMCNC: 33.5 G/DL (ref 32–36)
MCV RBC AUTO: 91 FL (ref 82–98)
METHADONE UR QL SCN: NEGATIVE
MICROSCOPIC COMMENT: ABNORMAL
MONOCYTES NFR BLD MANUAL: 5 % (ref 4–15)
NEUTROPHILS NFR BLD MANUAL: 45 % (ref 38–73)
NITRITE UR QL STRIP: NEGATIVE
NUCLEATED RBC (/100WBC) (OHS): 0 /100 WBC
OPIATES UR QL SCN: NEGATIVE
PCP UR QL: NEGATIVE
PH UR STRIP: 8 [PH]
PLATELET # BLD AUTO: 425 K/UL (ref 150–450)
PLATELET BLD QL SMEAR: ABNORMAL
PMV BLD AUTO: 9.7 FL (ref 9.2–12.9)
POTASSIUM SERPL-SCNC: 4.2 MMOL/L (ref 3.5–5.1)
PROT SERPL-MCNC: 8.9 GM/DL (ref 6–8.4)
PROT UR QL STRIP: ABNORMAL
RBC # BLD AUTO: 4.97 M/UL (ref 4.6–6.2)
RBC #/AREA URNS AUTO: 4 /HPF (ref 0–4)
SODIUM SERPL-SCNC: 137 MMOL/L (ref 136–145)
SP GR UR STRIP: >=1.03
T4 FREE SERPL-MCNC: NORMAL NG/DL
TSH SERPL-ACNC: 2.1 UIU/ML (ref 0.4–4)
UROBILINOGEN UR STRIP-ACNC: ABNORMAL EU/DL
VALPROATE SERPL-MCNC: <12.5 UG/ML (ref 50–100)
WBC # BLD AUTO: 11.1 K/UL (ref 3.9–12.7)
WBC #/AREA URNS AUTO: 9 /HPF (ref 0–5)

## 2025-03-30 PROCEDURE — 81003 URINALYSIS AUTO W/O SCOPE: CPT | Performed by: EMERGENCY MEDICINE

## 2025-03-30 PROCEDURE — 80164 ASSAY DIPROPYLACETIC ACD TOT: CPT | Performed by: EMERGENCY MEDICINE

## 2025-03-30 PROCEDURE — 63600175 PHARM REV CODE 636 W HCPCS: Performed by: EMERGENCY MEDICINE

## 2025-03-30 PROCEDURE — 82077 ASSAY SPEC XCP UR&BREATH IA: CPT | Performed by: EMERGENCY MEDICINE

## 2025-03-30 PROCEDURE — 25000003 PHARM REV CODE 250: Performed by: EMERGENCY MEDICINE

## 2025-03-30 PROCEDURE — 96372 THER/PROPH/DIAG INJ SC/IM: CPT | Performed by: EMERGENCY MEDICINE

## 2025-03-30 PROCEDURE — 85007 BL SMEAR W/DIFF WBC COUNT: CPT | Performed by: EMERGENCY MEDICINE

## 2025-03-30 PROCEDURE — 80143 DRUG ASSAY ACETAMINOPHEN: CPT | Performed by: EMERGENCY MEDICINE

## 2025-03-30 PROCEDURE — 80053 COMPREHEN METABOLIC PANEL: CPT | Performed by: EMERGENCY MEDICINE

## 2025-03-30 PROCEDURE — 99285 EMERGENCY DEPT VISIT HI MDM: CPT | Mod: 25

## 2025-03-30 PROCEDURE — 80307 DRUG TEST PRSMV CHEM ANLYZR: CPT | Performed by: EMERGENCY MEDICINE

## 2025-03-30 PROCEDURE — 84443 ASSAY THYROID STIM HORMONE: CPT | Performed by: EMERGENCY MEDICINE

## 2025-03-30 RX ORDER — DIPHENHYDRAMINE HYDROCHLORIDE 50 MG/ML
50 INJECTION, SOLUTION INTRAMUSCULAR; INTRAVENOUS
Status: COMPLETED | OUTPATIENT
Start: 2025-03-30 | End: 2025-03-30

## 2025-03-30 RX ORDER — HALOPERIDOL LACTATE 5 MG/ML
5 INJECTION, SOLUTION INTRAMUSCULAR
Status: COMPLETED | OUTPATIENT
Start: 2025-03-30 | End: 2025-03-30

## 2025-03-30 RX ORDER — LORAZEPAM 2 MG/ML
2 INJECTION INTRAMUSCULAR
Status: COMPLETED | OUTPATIENT
Start: 2025-03-30 | End: 2025-03-30

## 2025-03-30 RX ORDER — DIVALPROEX SODIUM 250 MG/1
250 TABLET, DELAYED RELEASE ORAL
Status: COMPLETED | OUTPATIENT
Start: 2025-03-30 | End: 2025-03-30

## 2025-03-30 RX ADMIN — LORAZEPAM 2 MG: 2 INJECTION INTRAMUSCULAR; INTRAVENOUS at 04:03

## 2025-03-30 RX ADMIN — HALOPERIDOL LACTATE 5 MG: 5 INJECTION, SOLUTION INTRAMUSCULAR at 04:03

## 2025-03-30 RX ADMIN — DIPHENHYDRAMINE HYDROCHLORIDE 50 MG: 50 INJECTION INTRAMUSCULAR; INTRAVENOUS at 04:03

## 2025-03-30 RX ADMIN — DIVALPROEX SODIUM 250 MG: 250 TABLET, DELAYED RELEASE ORAL at 04:03

## 2025-03-30 NOTE — ED TRIAGE NOTES
"Jensen Bland, a 29 y.o. male, presents to ED via [facility] with an OPC requested by mother after verbal altercation PTA. Per mother patient not sleeping or compliant with medications. Per OPC, pt stated that "someone was going to die today" and he is calling his mother a demon. Per OPC, showing signs of paranoia. Patient redirectable in ED. Pt denies SI/HI/AH/VH.     "

## 2025-03-30 NOTE — ED PROVIDER NOTES
"Encounter Date: 3/30/2025       History     Chief Complaint   Patient presents with    Psychiatric Evaluation     Pt BIB [facility] with OPC. Mother called due to aggressive and violent behavior. Officers have no paperwork and states "mother is signing it now". Pt denies any SI/HI. Pt denies having any pain or complaints.      HPI    29-year-old male with past medical history of asthma, eczema, schizophrenia presents with concern for aggressive and violent behavior.  Per mother after being placed under OPC patient is presenting with agitation, threatening to harm everyone home, states that he had the garage door open was yelling in the neighborhood.  He reports seeing someone else who was not there, calling his mother edema in and not recognizing her.  The patient reports he was last hospitalized at a psychiatric facility, [facility] in January.  He currently reports feeling well, has been compliant with his medications as he reports taking his Depakote.  He denies any marijuana use, reports smoking cigarettes.  He reports having no further complaints, denies any homicidal ideation, suicide ideation, or auditory visual hallucination.    Review of patient's allergies indicates:   Allergen Reactions    Peanut Anaphylaxis     Past Medical History:   Diagnosis Date    Asthma     Eczema     Schizo affective schizophrenia      Past Surgical History:   Procedure Laterality Date    colon surgery       Family History   Problem Relation Name Age of Onset    Kidney disease Father      Hypertension Father       Social History[1]  Review of Systems   Constitutional: Negative.    HENT: Negative.     Eyes: Negative.    Respiratory: Negative.     Cardiovascular: Negative.    Gastrointestinal: Negative.    Genitourinary: Negative.    Musculoskeletal: Negative.    Skin: Negative.    Neurological: Negative.        Physical Exam     Initial Vitals [03/30/25 1203]   BP Pulse Resp Temp SpO2   114/83 (!) 125 (!) 22 99.3 °F (37.4 °C) 99 %    "   MAP       --         Physical Exam    Nursing note and vitals reviewed.  Constitutional: He appears well-developed and well-nourished. He is not diaphoretic. No distress.   HENT:   Head: Normocephalic and atraumatic.   Nose: Nose normal. Mouth/Throat: No oropharyngeal exudate.   Eyes: EOM are normal. Pupils are equal, round, and reactive to light.   Neck: Neck supple. No tracheal deviation present. No JVD present.   Normal range of motion.  Cardiovascular:  Normal rate, regular rhythm, normal heart sounds and intact distal pulses.           Pulmonary/Chest: Breath sounds normal. No respiratory distress. He has no wheezes. He has no rhonchi. He has no rales.   Abdominal: Abdomen is soft. Bowel sounds are normal. He exhibits no distension. There is no abdominal tenderness. There is no rebound and no guarding.   Musculoskeletal:         General: No tenderness or edema. Normal range of motion.      Cervical back: Normal range of motion and neck supple.     Neurological: He is alert and oriented to person, place, and time. He has normal strength.   Skin: Skin is warm and dry. Capillary refill takes less than 2 seconds. No rash noted. No erythema.   Psychiatric:   Denies any homicidal ideation, suicide ideation, denies any auditory or visual hallucinations.         ED Course   Procedures  Labs Reviewed   URINALYSIS, REFLEX TO URINE CULTURE - Abnormal       Result Value    Color, UA Yellow      Appearance, UA Clear      pH, UA 8.0      Spec Grav UA >=1.030 (*)     Protein, UA 1+ (*)     Glucose, UA Negative      Ketones, UA Negative      Bilirubin, UA Negative      Blood, UA Negative      Nitrites, UA Negative      Urobilinogen, UA 2.0-3.0 (*)     Leukocyte Esterase, UA 2+ (*)    ACETAMINOPHEN LEVEL - Abnormal    Acetaminophen Level <3.0 (*)    COMPREHENSIVE METABOLIC PANEL - Abnormal    Sodium 137      Potassium 4.2      Chloride 103      CO2 22 (*)     Glucose 100      BUN 12      Creatinine 1.0      Calcium 10.2       "Protein Total 8.9 (*)     Albumin 4.7      Bilirubin Total 0.6      ALP 83      AST 34      ALT 28      Anion Gap 12      eGFR >60     DRUG SCREEN PANEL, URINE EMERGENCY - Abnormal    Benzodiazepine, Urine Negative      Methadone, Urine Negative      Cocaine, Urine Negative      Opiates, Urine Negative      Barbiturates, Urine Negative      Amphetamines, Urine Negative      THC Presumptive Positive (*)     Phencyclidine, Urine Negative      Urine Creatinine >450.0 (*)     Narrative:     This screen includes the following classes of drugs at the listed cut-off:     Benzodiazepines:        200 ng/ml   Methadone:              300 ng/ml   Cocaine metabolite:     300 ng/ml   Opiates:                300 ng/ml   Barbiturates:           200 ng/ml   Amphetamines:           1000 ng/ml   Marijuana metabs (THC): 50 ng/ml   Phencyclidine (PCP):    25 ng/ml     This is a screening test. If results do not correlate with clinical presentation, then a confirmatory send out test is advised.    This report is intended for use in clinical monitoring and management of patients. It is not intended for use in employment related drug testing."   VALPROIC ACID - Abnormal    Valproic Acid <12.5 (*)    URINALYSIS MICROSCOPIC - Abnormal    RBC, UA 4      WBC, UA 9 (*)     Bacteria, UA Rare      Hyaline Casts, UA 4 (*)     Microscopic Comment       MANUAL DIFFERENTIAL - Abnormal    Gran # (ANC) 5.0      Segmented Neutrophil % 45.0      Lymphocyte % 41.0      Monocyte % 5.0      Eosinophil % 9.0 (*)     Platelet Estimate Appears Normal     ALCOHOL,MEDICAL (ETHANOL) - Normal    Alcohol, Serum <10     CBC WITH DIFFERENTIAL - Normal    WBC 11.10      RBC 4.97      HGB 15.2      HCT 45.4      MCV 91      MCH 30.6      MCHC 33.5      RDW 13.9      Platelet Count 425      MPV 9.7      Nucleated RBC 0     TSH    TSH 2.100      Free T4       CBC W/ AUTO DIFFERENTIAL    Narrative:     The following orders were created for panel order CBC auto " differential.  Procedure                               Abnormality         Status                     ---------                               -----------         ------                     CBC with Differential[[id number]]       Normal              Final result               Manual Differential[[id number]]         Abnormal            Final result                 Please view results for these tests on the individual orders.          Imaging Results    None          Medications   haloperidol lactate injection 5 mg (0 mg Intramuscular Hold 3/30/25 1430)   diphenhydrAMINE injection 50 mg (0 mg Intramuscular Hold 3/30/25 1430)   LORazepam injection 2 mg (0 mg Intramuscular Hold 3/30/25 1430)   divalproex EC tablet 250 mg (has no administration in time range)     Medical Decision Making  Amount and/or Complexity of Data Reviewed  Labs: ordered.    Risk  Prescription drug management.      MDM:    29-year-old male with past medical history as noted above presenting with concerns for agitation, aggressive behavior.  Differential Diagnosis includes:  Pec, aggressive behavior emergent psychiatric evaluation and hospitalization.  Physical exam as noted above.  ED workup notable for alcohol negative, Tylenol negative, urinalysis with 2+ leukocytes, TSH 2.1, CMP with BUN 12, creatinine 1.0, UDS positive for THC, CBC white blood cell count 11.10, hemoglobin 15.2, valproic acid level pending.  Patient presentation appears concerning for agitation, possible acute psychosis.  Pending tele psychiatry will re-evaluate.  Tele psychiatry evaluated patient and recommend continued pec after discussing it further with his mother.  Additionally his Depakote level here is undetectable in his likely the noncompliant.  Pec will be continued and patient is medically cleared at this time for emergent psychiatric hospitalization.  Stable for transfer at this time.        Note was created using voice recognition software. Note may have occasional  typographical or grammatical errors, garbled syntax, and other bizarre constructions that may not have been identified and edited despite good kaitlyn initial review prior to signing.                   Medically cleared for psychiatry placement: 3/30/2025  4:07 PM                   Clinical Impression:  Final diagnoses:  [R45.1] Agitation (Primary)          ED Disposition Condition    Transfer to Psych Facility Stable          ED Prescriptions    None       Follow-up Information    None              [1]   Social History  Tobacco Use    Smoking status: Every Day     Current packs/day: 0.50     Types: Cigarettes    Smokeless tobacco: Never   Substance Use Topics    Alcohol use: Not Currently     Comment: socially    Drug use: Yes     Types: Marijuana        Phil Ortega MD  03/30/25 1515
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
## 2025-03-31 NOTE — ED NOTES
Report received from Maci RN; pt resting calmly asleep (arousable to voice) in psych safe room with ED sitter at bedside for 1:1 monitoring; pt PEC'ed, medically cleared, and awaiting transfer to [facility] psych facility; pt calm & cooperative; no distress noted.

## 2025-03-31 NOTE — ED NOTES
Patient transferred via [facility] from [facility] ED to [facility].  Patient calm and cooperative with hospital & EMS staff.  Per patient request, patient's mother Edna called and notified of patient's placement and transfer to [facility], understanding verbalized.

## 2025-04-15 ENCOUNTER — HOSPITAL ENCOUNTER (EMERGENCY)
Facility: HOSPITAL | Age: 30
Discharge: HOME | End: 2025-04-15
Attending: STUDENT IN AN ORGANIZED HEALTH CARE EDUCATION/TRAINING PROGRAM
Payer: COMMERCIAL

## 2025-04-15 VITALS
TEMPERATURE: 99 F | DIASTOLIC BLOOD PRESSURE: 65 MMHG | HEART RATE: 96 BPM | OXYGEN SATURATION: 99 % | WEIGHT: 150 LBS | RESPIRATION RATE: 17 BRPM | BODY MASS INDEX: 26.58 KG/M2 | SYSTOLIC BLOOD PRESSURE: 128 MMHG | HEIGHT: 63 IN

## 2025-04-15 DIAGNOSIS — B34.9 VIRAL SYNDROME: Primary | ICD-10-CM

## 2025-04-15 LAB
ABSOLUTE EOSINOPHIL (OHS): 0.67 K/UL
ABSOLUTE MONOCYTE (OHS): 1.05 K/UL (ref 0.3–1)
ABSOLUTE NEUTROPHIL COUNT (OHS): 5.29 K/UL (ref 1.8–7.7)
ALBUMIN SERPL BCP-MCNC: 4 G/DL (ref 3.5–5.2)
ALP SERPL-CCNC: 71 UNIT/L (ref 40–150)
ALT SERPL W/O P-5'-P-CCNC: 23 UNIT/L (ref 10–44)
AMPHET UR QL SCN: NEGATIVE
ANION GAP (OHS): 9 MMOL/L (ref 8–16)
APAP SERPL-MCNC: <3 UG/ML (ref 10–20)
AST SERPL-CCNC: 30 UNIT/L (ref 11–45)
BARBITURATE SCN PRESENT UR: NEGATIVE
BASOPHILS # BLD AUTO: 0.08 K/UL
BASOPHILS NFR BLD AUTO: 0.8 %
BENZODIAZ UR QL SCN: NEGATIVE
BILIRUB SERPL-MCNC: 0.3 MG/DL (ref 0.1–1)
BILIRUB UR QL STRIP.AUTO: NEGATIVE
BUN SERPL-MCNC: 12 MG/DL (ref 6–20)
CALCIUM SERPL-MCNC: 9.4 MG/DL (ref 8.7–10.5)
CANNABINOIDS UR QL SCN: ABNORMAL
CHLORIDE SERPL-SCNC: 104 MMOL/L (ref 95–110)
CLARITY UR: CLEAR
CO2 SERPL-SCNC: 26 MMOL/L (ref 23–29)
COCAINE UR QL SCN: NEGATIVE
COLOR UR AUTO: YELLOW
CREAT SERPL-MCNC: 0.8 MG/DL (ref 0.5–1.4)
CREAT UR-MCNC: 200.8 MG/DL (ref 23–375)
CTP QC/QA: YES
CTP QC/QA: YES
ERYTHROCYTE [DISTWIDTH] IN BLOOD BY AUTOMATED COUNT: 13.4 % (ref 11.5–14.5)
ETHANOL SERPL-MCNC: <10 MG/DL
GFR SERPLBLD CREATININE-BSD FMLA CKD-EPI: >60 ML/MIN/1.73/M2
GLUCOSE SERPL-MCNC: 93 MG/DL (ref 70–110)
GLUCOSE UR QL STRIP: NEGATIVE
HCT VFR BLD AUTO: 41.7 % (ref 40–54)
HGB BLD-MCNC: 13.8 GM/DL (ref 14–18)
HGB UR QL STRIP: NEGATIVE
IMM GRANULOCYTES # BLD AUTO: 0.07 K/UL (ref 0–0.04)
IMM GRANULOCYTES NFR BLD AUTO: 0.7 % (ref 0–0.5)
KETONES UR QL STRIP: NEGATIVE
LEUKOCYTE ESTERASE UR QL STRIP: NEGATIVE
LYMPHOCYTES # BLD AUTO: 2.97 K/UL (ref 1–4.8)
MCH RBC QN AUTO: 30.4 PG (ref 27–31)
MCHC RBC AUTO-ENTMCNC: 33.1 G/DL (ref 32–36)
MCV RBC AUTO: 92 FL (ref 82–98)
METHADONE UR QL SCN: NEGATIVE
NITRITE UR QL STRIP: NEGATIVE
NUCLEATED RBC (/100WBC) (OHS): 0 /100 WBC
OPIATES UR QL SCN: NEGATIVE
PCP UR QL: NEGATIVE
PH UR STRIP: 8 [PH]
PLATELET # BLD AUTO: 357 K/UL (ref 150–450)
PMV BLD AUTO: 9.5 FL (ref 9.2–12.9)
POC MOLECULAR INFLUENZA A AGN: NEGATIVE
POC MOLECULAR INFLUENZA B AGN: NEGATIVE
POTASSIUM SERPL-SCNC: 3.6 MMOL/L (ref 3.5–5.1)
PROT SERPL-MCNC: 7.8 GM/DL (ref 6–8.4)
PROT UR QL STRIP: NEGATIVE
RBC # BLD AUTO: 4.54 M/UL (ref 4.6–6.2)
RELATIVE EOSINOPHIL (OHS): 6.6 %
RELATIVE LYMPHOCYTE (OHS): 29.3 % (ref 18–48)
RELATIVE MONOCYTE (OHS): 10.4 % (ref 4–15)
RELATIVE NEUTROPHIL (OHS): 52.2 % (ref 38–73)
SALICYLATES SERPL-MCNC: <5 MG/DL (ref 15–30)
SARS-COV-2 RDRP RESP QL NAA+PROBE: NEGATIVE
SODIUM SERPL-SCNC: 139 MMOL/L (ref 136–145)
SP GR UR STRIP: 1.02
UROBILINOGEN UR STRIP-ACNC: NEGATIVE EU/DL
WBC # BLD AUTO: 10.13 K/UL (ref 3.9–12.7)

## 2025-04-15 PROCEDURE — 87635 SARS-COV-2 COVID-19 AMP PRB: CPT | Performed by: STUDENT IN AN ORGANIZED HEALTH CARE EDUCATION/TRAINING PROGRAM

## 2025-04-15 PROCEDURE — 85025 COMPLETE CBC W/AUTO DIFF WBC: CPT | Performed by: STUDENT IN AN ORGANIZED HEALTH CARE EDUCATION/TRAINING PROGRAM

## 2025-04-15 PROCEDURE — 25000003 PHARM REV CODE 250: Performed by: STUDENT IN AN ORGANIZED HEALTH CARE EDUCATION/TRAINING PROGRAM

## 2025-04-15 PROCEDURE — 99283 EMERGENCY DEPT VISIT LOW MDM: CPT

## 2025-04-15 PROCEDURE — 80307 DRUG TEST PRSMV CHEM ANLYZR: CPT | Performed by: STUDENT IN AN ORGANIZED HEALTH CARE EDUCATION/TRAINING PROGRAM

## 2025-04-15 PROCEDURE — 81003 URINALYSIS AUTO W/O SCOPE: CPT | Performed by: STUDENT IN AN ORGANIZED HEALTH CARE EDUCATION/TRAINING PROGRAM

## 2025-04-15 PROCEDURE — 87502 INFLUENZA DNA AMP PROBE: CPT

## 2025-04-15 PROCEDURE — 80179 DRUG ASSAY SALICYLATE: CPT | Performed by: STUDENT IN AN ORGANIZED HEALTH CARE EDUCATION/TRAINING PROGRAM

## 2025-04-15 PROCEDURE — 80053 COMPREHEN METABOLIC PANEL: CPT | Performed by: STUDENT IN AN ORGANIZED HEALTH CARE EDUCATION/TRAINING PROGRAM

## 2025-04-15 PROCEDURE — 82077 ASSAY SPEC XCP UR&BREATH IA: CPT | Performed by: STUDENT IN AN ORGANIZED HEALTH CARE EDUCATION/TRAINING PROGRAM

## 2025-04-15 PROCEDURE — 80143 DRUG ASSAY ACETAMINOPHEN: CPT | Performed by: STUDENT IN AN ORGANIZED HEALTH CARE EDUCATION/TRAINING PROGRAM

## 2025-04-15 RX ORDER — ONDANSETRON 4 MG/1
4 TABLET, ORALLY DISINTEGRATING ORAL
Status: COMPLETED | OUTPATIENT
Start: 2025-04-15 | End: 2025-04-15

## 2025-04-15 RX ORDER — ALUMINUM HYDROXIDE, MAGNESIUM HYDROXIDE, AND SIMETHICONE 1200; 120; 1200 MG/30ML; MG/30ML; MG/30ML
30 SUSPENSION ORAL ONCE
Status: COMPLETED | OUTPATIENT
Start: 2025-04-15 | End: 2025-04-15

## 2025-04-15 RX ORDER — LIDOCAINE HYDROCHLORIDE 20 MG/ML
15 SOLUTION OROPHARYNGEAL ONCE
Status: COMPLETED | OUTPATIENT
Start: 2025-04-15 | End: 2025-04-15

## 2025-04-15 RX ADMIN — LIDOCAINE HYDROCHLORIDE 15 ML: 20 SOLUTION ORAL at 06:04

## 2025-04-15 RX ADMIN — ONDANSETRON 4 MG: 4 TABLET, ORALLY DISINTEGRATING ORAL at 06:04

## 2025-04-15 RX ADMIN — ALUMINUM HYDROXIDE, MAGNESIUM HYDROXIDE, AND SIMETHICONE 30 ML: 200; 200; 20 SUSPENSION ORAL at 06:04

## 2025-04-15 NOTE — ED PROVIDER NOTES
Encounter Date: 4/15/2025       History     Chief Complaint   Patient presents with    Cough    Vomiting    Abdominal Pain    Nausea     Pt presents w/ cough, fever, chills body aches and N/V x 2 days.      29 y.o. male with history below presents for evaluation of cough, congestion.  Patient originally tells me that he is presenting for cough, congestion, abdominal pain, nausea and vomiting.  However on entering the room the patient is completely naked, squeezing the head of his penis, telling me that he is trying to clear out the wood and hair from the tip of his penis.  He then proceeds to tell me that sometimes he gets hot flashes and has to pull hair out of his penis and anus to make his hot flashes better.  He endorses pulling pieces of wood out of both as well.  He seems hyper focused during my interview on rubbing his ears, pulling hair from his penis and rubbing his legs.  On leaving the interview, the patient starts screaming for immediate return telling me that he has worms coming out of his penis.    The patient otherwise denies Chest Pain, Shortness of Breath, Abdominal Pain, N/V/D/Constipation, Eye complaints or Visual changes, Ear complaints, Neck or Back Pain, and Myalgias    Triage vitals were reviewed and are: Initial Vitals [04/15/25 0438]  BP: 128/65  Pulse: 96  Resp: 17  Temp: 98.6 °F (37 °C)  SpO2: 99 %  MAP: n/a    The Patient:   has a past medical history of Asthma, Eczema, and Schizo affective schizophrenia.   has a past surgical history that includes colon surgery.   reports that he has been smoking cigarettes. He has never used smokeless tobacco. He reports that he does not currently use alcohol. He reports current drug use. Drug: Marijuana.  Has allergies listed as: Peanut        The history is provided by the patient. No  was used.     Review of patient's allergies indicates:   Allergen Reactions    Peanut Anaphylaxis     Past Medical History:   Diagnosis Date     Asthma     Eczema     Schizo affective schizophrenia      Past Surgical History:   Procedure Laterality Date    colon surgery       Family History   Problem Relation Name Age of Onset    Kidney disease Father      Hypertension Father       Social History[1]  Review of Systems   All other systems reviewed and are negative.      Physical Exam     Initial Vitals [04/15/25 0438]   BP Pulse Resp Temp SpO2   128/65 96 17 98.6 °F (37 °C) 99 %      MAP       --         Physical Exam    Nursing note and vitals reviewed.  Constitutional: He appears well-developed and well-nourished.   Body mass index is 26.57 kg/m².   HENT:   Head: Normocephalic and atraumatic.   Boggy nares   Eyes: EOM are normal. Pupils are equal, round, and reactive to light.   Neck: Neck supple.   Cardiovascular:  Normal rate, regular rhythm, normal heart sounds and intact distal pulses.           Pulmonary/Chest: Breath sounds normal. No respiratory distress.   Musculoskeletal:      Cervical back: Neck supple.     Neurological: He is alert and oriented to person, place, and time. GCS score is 15. GCS eye subscore is 4. GCS verbal subscore is 5. GCS motor subscore is 6.   Skin: Skin is warm and dry. Capillary refill takes less than 2 seconds.   Psychiatric:   On entering the room, the patient is squeezing head of his penis and tells me that he is trying to pull her hair and would from his penis.  Throughout the interview he appears.  He is certainly difficult to redirect.  Hyper focused with some compulsive actions, including flicking his ears and rubbing his thighs in repetition.         ED Course   Procedures  Labs Reviewed   DRUG SCREEN PANEL, URINE EMERGENCY - Abnormal       Result Value    Benzodiazepine, Urine Negative      Methadone, Urine Negative      Cocaine, Urine Negative      Opiates, Urine Negative      Barbiturates, Urine Negative      Amphetamines, Urine Negative      THC Presumptive Positive (*)     Phencyclidine, Urine Negative       "Urine Creatinine 200.8      Narrative:     This screen includes the following classes of drugs at the listed cut-off:     Benzodiazepines:        200 ng/ml   Methadone:              300 ng/ml   Cocaine metabolite:     300 ng/ml   Opiates:                300 ng/ml   Barbiturates:           200 ng/ml   Amphetamines:           1000 ng/ml   Marijuana metabs (THC): 50 ng/ml   Phencyclidine (PCP):    25 ng/ml     This is a screening test. If results do not correlate with clinical presentation, then a confirmatory send out test is advised.    This report is intended for use in clinical monitoring and management of patients. It is not intended for use in employment related drug testing."   ACETAMINOPHEN LEVEL - Abnormal    Acetaminophen Level <3.0 (*)    SALICYLATE LEVEL - Abnormal    Salicylate Level <5.0 (*)    CBC WITH DIFFERENTIAL - Abnormal    WBC 10.13      RBC 4.54 (*)     HGB 13.8 (*)     HCT 41.7      MCV 92      MCH 30.4      MCHC 33.1      RDW 13.4      Platelet Count 357      MPV 9.5      Nucleated RBC 0      Neut % 52.2      Lymph % 29.3      Mono % 10.4      Eos % 6.6      Basophil % 0.8      Imm Grans % 0.7 (*)     Neut # 5.29      Lymph # 2.97      Mono # 1.05 (*)     Eos # 0.67 (*)     Baso # 0.08      Imm Grans # 0.07 (*)    COMPREHENSIVE METABOLIC PANEL - Normal    Sodium 139      Potassium 3.6      Chloride 104      CO2 26      Glucose 93      BUN 12      Creatinine 0.8      Calcium 9.4      Protein Total 7.8      Albumin 4.0      Bilirubin Total 0.3      ALP 71      AST 30      ALT 23      Anion Gap 9      eGFR >60     URINALYSIS, REFLEX TO URINE CULTURE - Normal    Color, UA Yellow      Appearance, UA Clear      pH, UA 8.0      Spec Grav UA 1.025      Protein, UA Negative      Glucose, UA Negative      Ketones, UA Negative      Bilirubin, UA Negative      Blood, UA Negative      Nitrites, UA Negative      Urobilinogen, UA Negative      Leukocyte Esterase, UA Negative     ALCOHOL,MEDICAL (ETHANOL) - " Normal    Alcohol, Serum <10     CBC W/ AUTO DIFFERENTIAL    Narrative:     The following orders were created for panel order CBC auto differential.  Procedure                               Abnormality         Status                     ---------                               -----------         ------                     CBC with Differential[[id number]]       Abnormal            Final result                 Please view results for these tests on the individual orders.   [place] URINE HOLD   POCT INFLUENZA A/B MOLECULAR    POC Molecular Influenza A Ag Negative      POC Molecular Influenza B Ag Negative       Acceptable Yes     SARS-COV-2 RDRP GENE    POC Rapid COVID Negative       Acceptable Yes            Imaging Results    None          Medications   ondansetron disintegrating tablet 4 mg (4 mg Oral Given 4/15/25 0614)   aluminum-magnesium hydroxide-simethicone 200-200-20 mg/5 mL suspension 30 mL (30 mLs Oral Given 4/15/25 0614)     And   LIDOcaine viscous HCl 2% oral solution 15 mL (15 mLs Oral Given 4/15/25 0614)     Medical Decision Making  Encounter Date: 4/15/2025  --------------------------------------------------------------------------  29 y.o. male presents for evaluation of cough, congestion.  Hemodynamically stable. Afebrile. Phonating and protecting the airway spontaneously. No clinical evidence for cardiovascular instability or impending airway compromise.  Remainder of physical exam as above.    Additional or Independent Historians available and contributing to the history as above: NONE  Prior medical records, when available, were reviewed. This includes a review of the patients comorbidities, medications, and recent hospital or outpatient notes.   Comorbid Conditions affecting evaluation, treatment or discharge planning:  History of schizophrenia   Social Determinates of Health identified and considered in the evaluation and treatment of this patient: None Identified  or significantly impacting evaluation and treatment    Differential diagnoses includes but is not limited to:   Decompensated psychiatric disease (schizophrenia, bipolar disorder, major depression), excited delirium, medication noncompliance, substance abuse/withdrawal, intentional drug overdose, medication toxicity, APAP/ASA overdose, acute stress reaction, personality disorder, malingering, metabolic derangement  suicidal attempt, serotonin syndrome  --------------------------------------------------------------------------        Amount and/or Complexity of Data Reviewed  Labs: ordered. Decision-making details documented in ED Course.    Risk  OTC drugs.  Prescription drug management.               ED Course as of 04/15/25 0954   Tue Apr 15, 2025   0501 BP: 128/65 [AN]   0501 Temp: 98.6 °F (37 °C) [AN]   0501 Pulse: 96 [AN]   0501 Resp: 17 [AN]   0501 SpO2: 99 % [AN]   0501 SARS-CoV-2 RNA, Amplification, Qual: Negative [AN]   0501 POC Molecular Influenza A Ag: Negative [AN]   0501 POC Molecular Influenza B Ag: Negative [AN]   0611 I discussed patient case with Dr. Sifuentes, psychiatry.  I discussed the initial presentation, history and physical exam, laboratory and imaging findings.  I discussed my concern for delusions.  They feel that the patient has improved from prior given recent hospitalization.  He does not feel that additional inpatient psychiatric therapy would benefit the patient and patient does not meet criteria for pec.  Okay to discharge from psychiatric standpoint. [AN]      ED Course User Index  [AN] Fili Brewer, ALEXEY                           Clinical Impression:  Final diagnoses:  [B34.9] Viral syndrome (Primary)          ED Disposition Condition    Discharge Stable          ED Prescriptions    None       Follow-up Information       Follow up With Specialties Details Why Contact Info    Abdoul Garcia MD Family Medicine   [address] [place] [address] [address] [place] [id number]  [phone]                    [1]   Social History  Tobacco Use    Smoking status: Every Day     Current packs/day: 0.50     Types: Cigarettes    Smokeless tobacco: Never   Substance Use Topics    Alcohol use: Not Currently     Comment: socially    Drug use: Yes     Types: Marijuana        Clara Sung PA-C  04/15/25 0954

## 2025-04-15 NOTE — ED NOTES
"As I was obtaining bloodwork from Pt, Pt reporting "worms" coming out of his penis. Alerted provider of this, per provider when he came in to assess Pt he was naked and pulling on his penis trying to show the 'worms', nothing observed coming out of the Pt's penis by the provider.   "

## 2025-04-15 NOTE — ED NOTES
Assumed care of Pt at this time. Per previous RN, Pt has been displaying normal behaviors and has been calm and cooperative. Per provider, while assessing Pt he noticed the Pt is displaying delusional behaviors such as saying bugs are crawling on him and exhibiting OCD like tendencies such as rubbing his legs 7 times and picking at ears 7 times.
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
## 2025-04-15 NOTE — DISCHARGE INSTRUCTIONS
Thank you for allowing me to participate as part of your health care team, and thank you for choosing [facility].    RYAN ACUÑA MD  Board Certified in Psychiatry & Addiction Medicine      IN CASE OF SUICIDAL THINKING, call the National Suicide Hotline Number: 988    988 Suicide & Crisis Lifeline: 988 , 8-393-134-TALK (5175)  https://988Food and Beverage.org           RECOMMENDATIONS & INSTRUCTIONS:     [x] Take all medication, from all providers, as prescribed.  [x] Follow with primary care provider for routine health maintenance and management of medical co-morbidities, as well as any indicated/needed specialists.  [x] If questions or concerns arise, or if experiencing side effects, adverse reactions or worsening symptoms, contact your provider through the [facility] portal at [email or web address], or call [phone] to reach the [facility] main line.  [x] In cases of emergencies, call [phone], or present directly to the emergency department for immediate assistance.  [x] Avoid alcohol intake; findings now indicate that when it comes to alcohol consumption, there is no safe amount that does not affect health.  [x] Abstain from illicit drug use.  [x] Upon discharge from an emergency department or inpatient setting, it is recommended to follow up with an outpatient provider within 1-2 weeks of discharge, but ideally as soon as possible; additionally, if you currently follow with an outpatient provider, expeditiously contact them upon discharge to apprise them of the situation and receive further instructions.    ADDITIONAL RECOMMENDATIONS:  - successfully complete a licensed addiction rehabilitation program, and follow all aftercare instructions and recommendations  - routinely attend 12 step (or equivalent) mutual self-help meetings  - work with a sponsor  - establish sobriety and maintain a recovery lifestyle    INFORMATION ON MENTAL HEALTH MEDICATIONS:     National [place] of Mental Health:    https://www.nim.nih.gov/health/topics/mental-health-medications     Web MD:   https://www.webmd.Symptom.ly       RESOURCES:     IN CASE OF SUICIDAL THINKING, call the National Suicide Hotline Number: 988    988 Suicide & Crisis Lifeline: 988 , 4-564-019-TALK (8255)  Provides 24/7, free and confidential support for people in distress, prevention and crisis resources for you or your loved ones, and best practices for professionals.    Call, text or chat.  https://988Revolution Moneyline.org     National Action [place] for Suicide Prevention: the National Action [place] for Suicide Prevention (Action [place]) is the nations public-private partnership for suicide prevention, working with more than 250 national partners.   https://theSenseLogixiance.org     National Strategy for Suicide Prevention & Risk Mitigation:  https://theactionalliance.org/our-strategy/national-strategy-suicide-prevention     [x] Fact Sheet:   https://www.[facility].gov/sites/default/files/national-strategy-for-suicide-prevention-factsheet.pdf     [x] Report:   https://www.ncbi.nlm.nih.gov/books/KGM686159/pdf/Bookshelf_NBK109917.pdf     Suicide Prevention Resource Center: The Suicide Prevention Resource Center (SPR) is the only federally supported resource center devoted to advancing the implementation of the National Strategy for Suicide Prevention. [facility] is funded by the U.S. Department of Health and Human Services' Substance Abuse and Mental Health Services Administration (SAMHSA).  https://www.[facility].org     [x] Safety Plan:   https://DoTheGlobe.Adyuka.iChange.Symptom.ly/wp-content/uploads/2021/08/Issa-Kike-Safety-Plan-8-6-21.pdf     [x] Suicide Risk Curve:  https://DoTheGlobe.ReplyBuy.Symptom.ly/wp-content/uploads/2021/08/Aofssdg-eifp-twfax-8-6-21.pdf     [place] Mental Health Advocacy Service: the state agency tasked with protecting the legal rights of people with behavioral health diagnoses.  https://mhas.[place].gov     Alcoholics Anonymous (AA): find a meeting near  you.  https://www.aa.org     SMI Adviser: resources for individuals and families with serious mental illness.  https://smiadviser.org     National Pooler for the Mentally Ill (ERVIN): the nation's largest grassroots organization dedicated to building better lives for individuals with mental illness.  https://www.ervin.org/Home     U.S. Department of Health and Human Services (HHS): the mission of HHS is to enhance the health and well-being of all Americans, by providing for effective health and human services and by fostering sound, sustained advances in the sciences underlying medicine, public health, and .   https://www.[facility].gov     Substance Abuse and Mental Health Services Administration (SAMHSA): [facility] is the agency within [facility] that leads public health efforts to advance the behavioral health of the nation. [facility]'s mission is to reduce the impact of substance abuse and mental illness on [place] communities.   https://www.[facility].gov     National Institutes of Health (NIH): a part of [facility], [facility] is the largest biomedical research agency in the world.   https://www.nih.gov     National [place] on Drug Abuse (JENNIFFER): sponsored by the NIH, the mission of JENNIFFER is to advance science on drug use and addiction and to apply that knowledge to improve individual and public health.  https://jenniffer.nih.gov     National [place] on Alcohol Abuse and Alcoholism (NIAAA): sponsored by the NIH, the mission of NIAA is to generate and disseminate fundamental knowledge about the effects of alcohol on health and well-being, and apply that knowledge to improve diagnosis, prevention, and treatment of alcohol-related problems, including alcohol use disorder, across the lifespan.   https://www.niaaa.nih.gov     National Harm Reduction Coalition: resources for harm reduction, including techniques, strategies, policy, and advocacy.  https://harmreduction.org     The SHARE Approach - A Model for Shared Decision Making:  [x]  Fact Sheet  https://www.ahrq.gov/sites/default/files/publications/files/share-approach_factsheet.pdf     AMA Principles of Medical Ethics - Informed Consent & Shared Decision Making:  [x] Chapter  https://www.ama-assn.org/system/files/2019-06/code-of-medical-mzckzg-owkdfmq-0.pdf     Safety Netting for Primary Care:  [x] Article  https://www.ncbi.nlm.nih.gov/pmc/articles/ZBS2402472/pdf/gybhppf-1112--e70.pdf       MEDICATION MANAGEMENT:     [x] In addition to the potential beneficial effects, the use of any medication or drug (prescribed, over the counter or otherwise) carries with it the risk of potential adverse effects.  Each has a set of typical adverse effects - some common, some rare - but idiosyncratic and unanticipated reactions unique to you are always possible.      [x] It is important to remember that untreated illness can also pose a risk, which must be taken into account when weighing the pros and cons of a medication trial.    [x] Medications and drugs can sometimes interact with each other in the body, leading to adverse effects - it is important that all your providers know all the medications and drugs you take - prescribed, over the counter, or otherwise.  Keep all your practitioners up to date with any changes.  It's always a good idea to keep an up-to-date list in an easily accessible location.    [x] There is an inherent unpredictability to all treatment, including the use of medication.  Unexpected outcomes can occur - keep me up to date with any difficulties you encounter.    [x] It is important to take medication as directed, and to comply fully with the instructions.  Check with the appropriate provider first before adjusting or stopping your medication on your own.    If you require further information pertaining to the issues outlined above, please reach out to your providers through the [facility] portal at [email or web address], or call [phone] to discuss.  See resource list for  additional material.     Additional information can be provided pertaining to your diagnosis, intended outcomes, target symptoms for treatment, and possible benefits and risks of medication - you can also access this information through the provided resources.  Possible alternatives to the current treatment plan (including no treatment) can also be reviewed.      GENERAL HEALTH & WELLNESS:     [x] Establish and follow regularly with a primary care physician for routine health maintenance and management of any medical comorbidities.  [x] Follow a healthy diet, exercise routinely, and monitor weight and metabolic parameters.  [x] Allow adequate time for sleep and practice good sleep hygiene.  [x] Do not operate a motor vehicle or heavy machinery if the effects of medications or the symptoms underlying your condition impair the ability for you to do so safely.    Dietary Guidelines for Americans, 4142-1817:  U.S. Department of Agriculture (USDA)  https://www.dietaryguidelines.gov/sites/default/files/2020-12/Dietary_Guidelines_for_Americans_2020-2025.pdf#page=31     The Nutrition Source:  [employer]  https://www.[facility].[place].[facility]/nutritionsource       SLEEP HYGIENE:     Follow these tips to establish healthy sleep habits:  [x] Keep a consistent sleep schedule. Get up at the same time every day, even on weekends or during vacations.  [x] Set a bedtime that is early enough for you to get at least 7-8 hours of sleep.  [x] Don't go to bed unless you are sleepy.  [x] If you don't fall asleep after 20 minutes, get out of bed. Go do a quiet activity without a lot of light exposure. It is especially important to not get on electronics.  [x] Establish a relaxing bedtime routine.  [x] Use your bed only for sleep and sex.  [x] Make your bedroom quiet and relaxing. Keep the room at a comfortable, cool temperature.  [x] Limit exposure to bright light in the evenings.  [x] Turn off electronic devices at least 30  minutes before bedtime.  [x] Don't eat a large meal before bedtime. If you are hungry at night, eat a light, healthy snack.  [x] Exercise regularly and maintain a healthy diet.  [x] Avoid consuming caffeine in the afternoon or evening.  [x] Avoid consuming alcohol before bedtime.  [x] Reduce your fluid intake before bedtime.    QUICK TIPS FOR BETTER SLEEP  Reduce smartphone usage Create and maintain a nightly ritual Avoid caffeine 4-6 hours before sleeping Don't eat or drink too much at bedtime Sleep at the same time every night        American Academy of Sleep Medicine - Healthy Sleep Habits:  https://sleepeducation.org/healthy-sleep/healthy-sleep-habits     American Academy of Sleep Medicine - Bedtime Calculator:  https://sleepeducation.org/healthy-sleep/bedtime-calculator     American Academy of Sleep Medicine - Cognitive Behavioral Therapy for Insomnia (CBT-I):  https://sleepeducation.org/patients/cognitive-behavioral-therapy     American Academy of Sleep Medicine - Insomnia:  https://sleepeducation.org/sleep-disorders/insomnia       ALCOHOL & DRUG USE COUNSELING:     Preventing Excessive Alcohol Use (CDC):  https://www.cdc.gov/alcohol/fact-sheets/moderate-drinking.htm#:~:text=To%20reduce%20the%20risk%20of,days%20when%20alcohol%20is%20consumed.     [x] Alcohol consumption is associated with a variety of short- and long-term health risks, including motor vehicle crashes, violence, sexual risk behaviors, high blood pressure, and various cancers (e.g., breast cancer).  [x] The risk of these harms increases with the amount of alcohol you drink. For some conditions, like some cancers, the risk increases even at very low levels of alcohol consumption (less than 1 drink).  [x] To reduce the risk of alcohol-related harms, the 6552-5601 Dietary Guidelines for Americans recommends that adults of legal drinking age can choose not to drink, or to drink in moderation by limiting intake to 2 drinks or less in a day for men or  1 drink or less in a day for women, on days when alcohol is consumed.  [x] The Guidelines also do not recommend that individuals who do not drink alcohol start drinking for any reason and that if adults of legal drinking age choose to drink alcoholic beverages, drinking less is better for health than drinking more.  [x] The Guidelines note that some people should not drink alcohol at all, such as:  - If they are pregnant or might be pregnant.  - If they are younger than age 21.  - If they have certain medical conditions or are taking certain medications that can interact with alcohol.  - If they are recovering from an alcohol use disorder or if they are unable to control the amount they drink.  [x] The Guidelines also note that not drinking alcohol is the safest option for women who are lactating. Generally, moderate consumption of alcoholic beverages by a woman who is lactating (up to 1 standard drink in a day) is not known to be harmful to the infant, especially if the woman waits at least 2 hours after a single drink before nursing or expressing breast milk. Women considering consuming alcohol during lactation should talk to their healthcare provider.  [x] The Guidelines note, Emerging evidence suggests that even drinking within the recommended limits may increase the overall risk of death from various causes, such as from several types of cancer and some forms of cardiovascular disease. Alcohol has been found to increase risk for cancer, and for some types of cancer, the risk increases even at low levels of alcohol consumption (less than 1 drink in a day).  [x] Although past studies have indicated that moderate alcohol consumption has protective health benefits (e.g., reducing risk of heart disease), recent studies show this not to be true.  [x] Its important to focus on the amount people drink on the days that they drink. Even if women consume an average of 1 drink per day or men consume an average of 2  "drinks per day, binge drinking increases the risk of experiencing alcohol-related harm in the short-term and in the future.    Drinking Levels Defined (NIAAA):  https://www.niaaa.nih.gov/alcohol-health/overview-alcohol-consumption/moderate-binge-drinking     Drinking in Moderation:  According to the "Dietary Guidelines for Americans 6262-0599, U.S. Department of Health and Human Services and U.S. Department of Agriculture, adults of legal drinking age can choose not to drink or to drink in moderation by limiting intake to 2 drinks or less in a day for men and 1 drink or less in a day for women, when alcohol is consumed. Drinking less is better for health than drinking more, and findings now indicate that when it comes to alcohol consumption, there is no safe amount that does not affect health.    Binge Drinking:  NIAAA defines binge drinking as a pattern of drinking alcohol that brings blood alcohol concentration (RANDA) to 0.08 percent - or 0.08 grams of alcohol per deciliter - or higher.  For a typical adult, this pattern corresponds to consuming 5 or more drinks (male), or 4 or more drinks (female), in about 2 hours.    The Substance Abuse and Mental Health Services Administration (SAMHSA), which conducts the annual National Survey on Drug Use and Health (NSDUH), defines binge drinking as 5 or more alcoholic drinks for males or 4 or more alcoholic drinks for females on the same occasion (i.e., at the same time or within a couple of hours of each other) on at least 1 day in the past month.    Heavy Alcohol Use:  NIAAA defines heavy drinking as follows:  - For men, consuming more than 4 drinks on any day or more than 14 drinks per week.  - For women, consuming more than 3 drinks on any day or more than 7 drinks per week.     [facility] defines heavy alcohol use as binge drinking on 5 or more days in the past month.    Patterns of Drinking Associated with Alcohol Use Disorder:  Binge drinking and heavy alcohol use can " increase an individual's risk of alcohol use disorder.    Certain people should avoid alcohol completely, including those who:  - Plan to drive or operate machinery, or participate in activities that require skill, coordination, and alertness.  - Take certain over-the-counter or prescription medications.  - Have certain medical conditions.  - Are recovering from alcohol use disorder or are unable to control the amount that they drink.  - Are younger than age 21.  - Are pregnant or may become pregnant.    U.S. Standard Drink  12 oz beer   (5% ABV) 8 oz malt liquor   (7% ABV) 5 oz wine   (12% ABV) 1.5 oz 80-proof distilled spirit  (40% ABV)        Heroin use harm reduction:  1. Carry naloxone. When using heroin, make sure you have at least one dose of naloxone - the overdose reversal drug - and have it in plain view. Understand how to give it.  2. Try a small dose first. It is best to first try a small amount of the heroin to check the effect.  3. Dont use heroin alone. Always use heroin with someone else and take turns while using.    It is possible to overdose with heroin whether you are snorting, injecting or using it in another form.    Signs of an overdose or emergency:   - The person is awake but unable to talk.  - Their body is limp.  - Their breathing is shallow or slow or stopped.  - Their skin is pale, ashen or clammy/sweaty.  - They are unconscious.    In case of emergency, give naloxone. If you suspect the heroin may contain fentanyl, administer more than one dose. Seek medical help even if naloxone has been given. Call 911 for help.      ADDICTION & RECOVERY:     [x] Addiction is a chronic medical illness, and as such, requires treatment through the lifespan  [x] Individuals with a history of alcohol or drug use disorder should maintain sobriety and a recovery lifestyle, as failure to do so can lead to relapse and progression of illness  [x] As part of a recovery lifestyle, it is advised to routinely  attend mutual self-help groups (12 step or equivalent) and to work with a sponsor  [x] For those with a history of alcohol or drug use disorder, it is important that all members of your treatment team - regardless of specialty - are fully apprised of your history and recovery plan moving forward.    For those struggling with active addiction, [facility] is an intensive outpatient addiction rehabilitation program located at [facility] on [facility] - please call [phone] to speak with an  about enrolling in the program.      ADHD TREATMENT AND STIMULANT MEDICATIONS:     [facility] Prescription Stimulants Drug Facts  CMS Stimulant and Related Medications: Use in Adults  ELI Drug Fact Sheets: Stimulants  FDA Drug Safety Communication: Stimulants  [facility] ADHD  WebMD ADHD Medications and Side Effects  [facility]: ADHD Medication      SHARED DECISION MAKING & INFORMED CONSENT:     Shared medical decision making and informed consent are the hallmark and bedrock of excellent clinical care.  During the encounter, shared medical decision making was employed and informed consent was obtained, to the degree possible, whenever feasible, appropriate and relevant. Those interventions are supplemented here with written materials, detailing the topics in more depth.       PSYCHOEDUCATION:     Psychoeducation pertaining to the following -     Diagnosis Etiology Disease Processes Natural Progression   Treatment Options Time Course Safety Netting Informed Consent   Intended Benefits of Medication Expectable Adverse Effects Target Symptoms for Treatment Alternatives to Current Treatment   Shared   Decision Making Risk Mitigation Strategies Harm Reduction Techniques Associated Bio-Med Complications     - can be further discussed and reviewed (you can also access additional information through the provided resources in this document).      Effective communication is essential in order to engage in  shared medical decision making.  If you had difficulty understanding anything during your encounter or in this supplementary document, please contact your providers through the [facility] portal at [email or web address] or call [phone].     Chivo Dictionary  https://dictionary.chivo.org/us       It can be easy to miss, forget, or misremember important important information that was discussed during the session - especially when you're stressed, upset, or don't feel well.  If you or a representative have any additional questions, concerns, or topics to discuss - please contact your providers through the [facility] portal at [email or web address] or call [phone].    Memory Loss  https://www.Dtime.Nanotron Technologies/brain/memory-loss    Causes of Memory Loss  https://www.[employer]/what-causes-memory-loss-5112130    Memory loss: When to seek help  https://www.[facility].org/diseases-conditions/alzheimers-disease/in-depth/memory-loss/art-42888002    Memory, Forgetfulness, and Aging: What's Normal and What's Not?  https://www.yogi.nih.gov/health/memory-forgetfulness-and-aging-whats-normal-and-whats-not    Depression and Memory Loss  https://www.PetBox/health/depression/depression-and-memory-loss    The Relationship Between Anxiety and Memory Loss  https://www.activ8 Intelligence.[facility]/academics/blog-posts/the-relationship-between-anxiety-and-memory-loss     PRESCRIPTION DRUG MANAGEMENT:     Prescription Drug Management entails the following:  [x] The review, recommendation, or consideration without recommendation of medications during the encounter.  [x] Discussion (to the extent possible) with the patient and/or other interested parties of the diagnosis, target symptoms, intended outcomes, and possible benefits and risks of medication, as well as alternatives (including no treatment), if not otherwise known or stated prior.  [x] Discussion (to the extent possible) with the patient and/or other interested parties of  possible expectable adverse effects of any proposed individual psychotropic agents, as well as the inherent unpredictability of treatment, if not otherwise known or stated prior.  [x] Informed consent is sought from the patient (and/or guardian/designated decision maker, if applicable) after a thorough discussion (to the extent possible) of the aforementioned points outlined above.  [x] The provision of counseling (to the extent possible) to the patient and/or other interested parties on the importance of full compliance with any prescribed medication, if not otherwise known or stated prior.    Information on psychotropic medication can be found at:   National [place] of Mental Health: Information on Mental Health Medications      RISK MITIGATION, HARM REDUCTION & SAFETY NETTING:     Risk Mitigation Strategies, Harm Reduction Techniques, and Safety Netting are important interventions that can reduce acute and chronic risk.  As such, opportunities were sought to incorporate psychoeducation and practical advice pertaining to these topics into the encounter, to the degree possible, whenever feasible, appropriate and relevant.  Those interventions are supplemented here with written materials, detailing the topics in more depth.       RISK MITIGATION STRATEGIES:     Risk mitigation strategies are used to reduce the likelihood of future episodes of suicide, homicide, violence, and/or other problematic behaviors (e.g. self-injurious, risky, addictive, compulsive, impulsive). The following are examples of risk mitigation strategies which you can employ in order to reduce your overall burden of risk.     [x] Treatment of underlying psychopathology driving acute and chronic risk to the extent possible.  [x] Use of self administered rating scales and journaling to assist in risk tracking.  [x] Exploration of protective factors to potentially counterbalance risk.  [x] Identification and avoidance of triggers and situations  that increase risk, including excessive alcohol and drug use.  [x] Timely follow up and ongoing treatment of mental health issues moving forward.  [x] Full compliance with medication regimen.  [x] A good working knowledge of your medication regimen, including specific instructions on the administration of the medications.  [x] Consultation with an appropriate medical provider prior to altering or deviating from these instructions on your own.  [x] Active involvement and participation of family and natural support wherever feasible and possible.  [x] Development and review of coping strategies that can be immediately deployed in times of acute crisis.  [x] Implementation of home safety practices and the removal/reduction of access to lethal means (including, but not limited to, firearms, certain types and quantities of medication, poisons, or other methods you may have contemplated or identified).  [x] Collaborative development of a written safety plan with your treatment team and loved ones that can be immediately referred to in times of acute crisis.  [x] Utilization of a safety contract to engage your treatment team and further assess/manage risk.  [x] A good working knowledge of how to access emergency treatment in times of acute crisis.  [x] Utilization of suicide hotlines number (988) and resources in times of crisis.    If you require further information pertaining to the issues outlined above, please reach out to your providers through the [facility] portal at [email or web address], or call [phone] to discuss.  See resource list for additional material.      SAFETY NETTING:     In healthcare, safety netting refers to the provision of information to help patients or carers identify the need to consult a health care professional if a health concern arises or changes.  The relevance of this advice is most obvious with chronic mental illnesses, as their dynamic nature, with symptoms and signs emerging at  different times and in different combinations, makes safety netting particularly important.  Specific safety net advice for you includes the following:    [x] The existence of uncertainty. Mental health diagnoses and conditions contain at least some degree of uncertainty - knowing this, you should feel empowered to reconsult if necessary.  [x] What exactly to look out for. Given the recognised risk of possible deterioration or the development of complications, you should become familiar with the specific clinical features (including red flags) to look out for.    [x] How exactly to seek further help. You should know how and where to seek further help if needed.  Make a plan in advance and keep it handy.  It's also a good idea to share the plan with your treatment providers and loved ones.  [x] What to expect about time course. Mental health diagnoses and conditions often have an expected time course, which is important information for you to know.  However, if your difficulties do not conform to this time line and concerns arise, do not delay seeking further medical advice.    If you require further information pertaining to the issues outlined above, please reach out to your providers through the [facility] portal at [email or web address], or call [phone] to discuss.  See resource list for additional material.      HARM REDUCTION:     Harm Reduction techniques are used in an effort to reduce negative consequences associated with risky and maladaptive behaviors, until cessation of the problematic behaviors can be established.  Harm reduction is best thought of as a journey and not a destination; it is not an endorsement of problematic behavior, but an acknowledgement and recognition of the step-by-step nature of recovery.      Although commonly employed in working with people who suffer with drug addiction, harm reduction can be more broadly applied to any problematic behavior.    Harm Reduction and Substance  Abuse:  [x] Incorporates a spectrum of strategies that includes safer use, managed use, abstinence, meeting people who use drugs where theyre at, and addressing conditions of use along with the use itself.  [x] Accepts, for better or worse, that licit and illicit drug use is part of our world and chooses to work to minimize its harmful effects rather than simply ignore or condemn them.  [x] Understands drug use as a complex, multi-faceted phenomenon that encompasses a continuum of behaviors from severe use to total abstinence, and acknowledges that some ways of using drugs are clearly safer than others.  [x] Calls for the non-judgmental, non-coercive provision of services and resources to people who use drugs and the communities in which they live in order to assist them in reducing attendant harm.  [x] Affirms people who use drugs themselves as the primary agents of reducing the harms of their drug use and seeks to empower them to share information and support each other in strategies which meet their actual conditions of use.  [x] Does not attempt to minimize or ignore the real and tragic harm and danger that can be associated with illicit drug use.  [x] Meets people where they are, but seeks to not leave them there.  [x] Examples of specific interventions include, but are not limited to, narcan (naloxone), medication assisted treatment, syringe access, overdose prevention, and safer drug use techniques.    Key Harm Reduction Strategies: Opioid Use Disorder  [x] Safe Injection Sites & Equipment  [x] Managed Use  [x] Syringe Exchange Programs  [x] Fentanyl Test Strips  [x] Pharmacotherapy/Medication Assisted Treatment  [x] Narcan  [x] Good Muslim Laws  [x] Treatment Instead of senior living  [x] Diversion Programs  [x] Overdose Education  [x] Abstinence    Whether or not you struggle with substance abuse, any and all opportunities to employ harm reduction techniques to address difficult to change problematic  "behaviors should be sought and implemented - whenever and wherever feasible, relevant and applicable. Additionally, harm reduction techniques can be applied broadly, and are relevant for a multitude of situations - even those that do not involve problematic or maladaptive behaviors.     EXAMPLES OF HARM REDUCTION IN OTHER AREAS  SUN SCREEN SEAT BELTS SPEED LIMITS BIRTH CONTROL        If you require further information pertaining to the issues outlined above, please reach out to your providers through the [facility] portal at [email or web address], or call [phone] to discuss.  See resource list for additional material.      FIREARM SAFETY:     THE SIX BASIC GUN SAFETY RULES  There are six basic gun safety rules for gun owners to understand and practice at all times:  Treat all guns as if they are loaded. Always assume that a gun is loaded even if you think it is unloaded. Every time a gun is handled for any reason, check to see that it is unloaded. If you are unable to check a gun to see if it is unloaded, leave it alone and seek help from someone more knowledgeable about guns.  Keep the gun pointed in the safest possible direction. Always be aware of where a gun is pointing. A "safe direction" is one where an accidental discharge of the gun will not cause injury or damage. Only point a gun at an object you intend to shoot. Never point a gun toward yourself or another person.  Keep your finger off the trigger until you are ready to shoot. Always keep your finger off the trigger and outside the trigger guard until you are ready to shoot. Even though it may be comfortable to rest your finger on the trigger, it also is unsafe. If you are moving around with your finger on the trigger and stumble or fall, you could inadvertently pull the trigger. Sudden loud noises or movements can result in an accidental discharge because there is a natural tendency to tighten the muscles when startled. The trigger is for firing and " the handle is for handling.  Know your target, its surroundings and beyond. Check that the areas in front of and behind your target are safe before shooting. Be aware that if the bullet misses or completely passes through the target, it could strike a person or object. Identify the target and make sure it is what you intend to shoot. If you are in doubt, DON'T SHOOT! Never fire at a target that is only a movement, color, sound or unidentifiable shape. Be aware of all the people around you before you shoot.  Know how to properly operate your gun. It is important to become thoroughly familiar with your gun. You should know its mechanical characteristics including how to properly load, unload and clear a malfunction from your gun. Obviously, not all guns are mechanically the same. Never assume that what applies to one make or model is exactly applicable to another. You should direct questions regarding the operation of your gun to your firearms dealer, or contact the  directly.  Store your gun safely and securely to prevent unauthorized use. Guns and ammunition should be stored separately. When the gun is not in your hands, you must still think of safety. Use an approved firearms safety device on the gun, such as a trigger lock or cable lock, so it cannot be fired. Store it unloaded in a locked container, such as an approved lock box or a gun safe. Store your gun in a different location than the ammunition. For maximum safety you should use both a locking device and a storage container.    ADDITIONAL SAFETY POINTS  The six basic safety rules are the foundational rules for gun safety. However, there are additional safety points that must not be overlooked.  [x] Never handle a gun when you are in an emotional state such as anger or depression. Your judgment may be impaired. If you have acute or chronic suicidal ideation, a suicide plan, or suicidal intent, have firearms removed and your access restricted by a  "trusted loved one or other responsible individual or agency.  [x] Never shoot a gun in celebration (the Fourth of July or New Year's Blanca, for example). Not only is this unsafe, but it is generally illegal. A bullet fired into the air will return to the ground with enough speed to cause injury or death.  [x] Do not shoot at water, flat or hard surfaces. The bullet can ricochet and hit someone or something other than the target.  [x] Hand your gun to someone only after you verify that it is unloaded and the cylinder or action is open. Take a gun from someone only after you verify that it is unloaded and the cylinder or action is open.  [x] Guns, alcohol and drugs don't mix. Alcohol and drugs can negatively affect judgment as well as physical coordination. Alcohol and any other substance likely to impair normal mental or physical functions should not be used before or while handling guns. Avoid handling and using your gun when you are taking medications that cause drowsiness or include a warning to not operate machinery while taking this drug.   [x] The loud noise from a fired gun can cause hearing damage, and the debris and hot gas that is often emitted can result in eye injury. Always wear ear and eye protection when shooting a gun.      GUNS AND CHILDREN - FIREARM OWNER RESPONSIBILITIES    You Cannot Be Too Careful with Children and Guns  [x] There is no such thing as being too careful with children and guns. Never assume that simply because a toddler may lack finger strength, they can't pull the trigger. A child's thumb has twice the strength of the other fingers. When a toddler's thumb "pushes" against a trigger, invariably the barrel of the gun is pointing directly at the child's face. NEVER leave a firearm lying around the house.  [x] Child safety precautions still apply even if you have no children or if your children have grown to adulthood and left home. A nephew, niece, neighbor's child or a grandchild may " "come to visit. Practice gun safety at all times.  [x] To prevent injury or death caused by improper storage of guns in a home where children are likely to be present, you should store all guns unloaded, lock them with a firearms safety device and store them in a locked container. Ammunition should be stored in a location separate from the gun.    Talking to Children About Guns  [x] Children are naturally curious about things they don't know about or think are "forbidden." When a child asks questions or begins to act out "gun play," you may want to address his or her curiosity by answering the questions as honestly and openly as possible. This will remove the mystery and reduce the natural curiosity. Also, it is important to remember to talk to children in a manner they can relate to and understand. This is very important, especially when teaching children about the difference between "real" and "make-believe." Let children know that, even though they may look the same, real guns are very different than toy guns. A real gun will hurt or kill someone who is shot.    Instill a Mind Set of Safety and Responsibility  [x] The American Academy of Pediatrics reports that adolescence is a highly vulnerable stage in life for teenagers struggling to develop traits of identity, independence and autonomy. Children, of course, are both naturally curious and innocently unaware of many dangers around them. Thus, adolescents as well as children may not be sufficiently safeguarded by cautionary words, however frequent. Contrary actions can completely undermine good advice. A "Do as I say and not as I do" approach to gun safety is both irresponsible and dangerous.  [x] Remember that actions speak louder than words. Children learn most by observing the adults around them. By practicing safe conduct you will also be teaching safe conduct.    Safety and Storage Devices  [x] If you decide to keep a firearm in your home you must consider " the issue of how to store the firearm in a safe and secure manner. There are a variety of safety and storage devices currently available to the public in a wide range of prices. Some devices are locking mechanisms designed to keep the firearm from being loaded or fired, but don't prevent the firearm from being handled or stolen. There are also locking storage containers that hold the firearm out of sight. For maximum safety you should use both a firearm safety device and a locking storage container to store your unloaded firearm.   Two of the most common locking mechanisms are trigger locks and cable locks. Trigger locks are typically two-piece devices that fit around the trigger and trigger guard to prevent access to the trigger. One side has a post that fits into a hole in the other side. They are locked by a key or combination locking mechanism. Cable locks typically work by looping a strong steel cable through the action of the firearm to block the firearm's operation and prevent accidental firing. However, neither trigger locks nor cable locks are designed to prevent access to the firearm.   [x] Smaller lock boxes and larger gun safes are two of the most common types of locking storage containers. One advantage of lock boxes and gun safes is that they are designed to completely prevent unintended handling and removal of a firearm. Lock boxes are generally constructed of sturdy, high-grade metal opened by either a key or combination lock. Gun safes are quite heavy, usually weighing at least 50 pounds. While gun safes are typically the most expensive firearm storage devices, they are generally more reliable and secure.     Remember: Safety and storage devices are only as secure as the precautions you take to protect the key or combination to the lock.    RULES FOR KIDS  Adults should be aware that a child could discover a gun when a parent or another adult is not present. This could happen in the child's own  home; the home of a neighbor, friend or relative; or in a public place such as a school or park. If this should happen, a child should know the following rules and be taught to practice them.   Stop  The first rule for a child to follow if he/she finds or sees a gun is to stop what he/she is doing.  Don't Touch!  The second rule is for a child not to touch a gun he/she finds or sees. A child may think the best thing to do if he/she finds a gun is to pick it up and take it to an adult. A child needs to know he/she should NEVER touch a gun he/she may find or see.  Leave the Area  The third rule is to immediately leave the area. This would include never taking a gun away from another child or trying to stop someone from using gun.  Tell an Adult  The last rule is for a child to tell an adult about the gun he/she has seen. This includes times when other kids are playing with or shooting a gun.     METHODS OF CHILDPROOFING YOUR FIREARM  As a responsible handgun owner, you must recognize the need and be aware of the methods of childproofing your handgun, whether or not you have children.  Whenever children could be around, whether your own, or a friend's, relative's or neighbor's, additional safety steps should be taken when storing firearms and ammunition in your home.  [x] Always store your firearm unloaded.  [x] Use a firearms safety device AND store the firearm in a locked container.  [x] Store the ammunition separately in a locked container.  Always storing your firearm securely is the best method of childproofing your firearm; however, your choice of a storage place can add another element of safety. Carefully choose the storage place in your home especially if children may be around.  [x] Do not store your firearm where it is visible.  [x] Do not store your firearm in a bedside table, under your mattress or pillow, or on a closet shelf.  [x] Do not store your firearm among your valuables (such as jewelry or  cameras) unless it is locked in a secure container.  [x] Consider storing firearms not possessed for self-defense in a safe and secure manner away from the home.    [employer] for Gun Safety:  https://www.everytown.org       Gun Violence: Prediction, Prevention and Policy  American Psychological Association Panel of Experts Report  https://www.apa.org/pubs/reports/gun-violence-report.pdf     If you require further information pertaining to any of the issues outlined above, please reach out to your providers through the [facility] portal at [email or web address], or call [phone] to discuss.  See resource list for additional material.      IN CASE OF SUICIDAL THINKING, call the [employer] Suicide Hotline Number: 988    988 Suicide & Crisis Lifeline: 988 , 4-022-826-TALK (8255)  Provides 24/7, free and confidential support for people in distress, prevention and crisis resources for you or your loved ones, and best practices for professionals.    Call, text or chat.  https://Lively           REFERRAL RECOMMENDATIONS FOR SUBSTANCE ABUSE & MENTAL HEALTH      IN CASE OF SUICIDAL THINKING, call the [employer] Suicide Hotline Number: 988    988 Suicide & Crisis Lifeline: 988 , 9-047-167-EOJE (8255)  https://Lively       SUBSTANCE ABUSE:     [facility] (formerly known as the ABU)  [x] [phone], Option 2  [x] [address], [place] [address]  [x] [email or web address]  [x] [facility] delivers comprehensive and collaborative treatment for alcohol and substance use disorders.  Excellent program for working professionals or anyone else seeking recovery.  [x] Requires insurance approval prior to starting program, call number above for more information.  [x] Intensive Outpatient Rehabilitation Program - M-F 9am-3pm - daily groups with psychologists and social workers, sessions with MDs 3x per week   [x] Ambulatory detox  and dual diagnosis available      SUBOXONE:     NOTE: some Suboxone clinics require their clients to participate in a structured program (such as an IOP) in order to be prescribed Suboxone.  Some clinics have a long waiting list.  Most of these clinics do not accept walk-in clients, so call first to to learn what must be done to get started on Suboxone.    [facility] - [phone] (can do Sublocade)  [address], [place] [address]    [facility]  [address], [place]  [phone]    [facility] - [phone] (can do Sublocade)  [address] [place] [address]    [facility]  [address] [place]  [phone]     [facility] (very short waiting list, may accept some walk-in's but call first if possible)  [address], [place] [id number] [phone]; [phone]    [facility]   [address] [place]    [phone]    [facility] (can usually be seen within a week but is cash only for appointment)  [address] [place] [address] [place], [place]    [facility] ([facility])  [address] [place]  [phone]    [facility] ([facility])  [address] [place] [id number] [phone]    [place]:    [facility] - [address] [place] [place] [address] - Tel: [phone]    Gallo Juárez - [address] [place] [place] [address] - Tel: [phone]    [facility]erre[facility] Cotton - [address] [place] [address] - Tel: [phone]    Carl Palma - [address] [place] [address] - Tel: [phone]    Tomy Gould - [address] [place] [address] - Tel: [phone]    [place]:     Dr. Zahida Gaffney and Dr. Festus Hensley - [address], [place] - Tel: [phone]    Dr. Desire Littlejohn - [address] [place] [address] [place] - Tel: [phone]    Dr. Martín Navas - Tel: [phone]    Dr. Kartik Nobles [facility] - [phone]      METHADONE:     [facility] (the only  methadone clinic in the city, has two locations)  [x] [place] - [address] [place] [address], [phone]  [x] [facility] [place] [address], [phone]      12 STEP PROGRAMS (and similar):     Alcoholics Anonymous (local)  [x] [phone]  [x] www.[place].org for schedules for in-person and online meetings  [x] There are AA meetings throughout the day all over town  [x] AA costs nothing to attend; they pass a basket for donations but this is not required    Narcotics Anonymous  [x] [phone]  [x] www.noana.org  [x] There are NA meetings throughout the day all over [facility]  [x] NA costs nothing to attend; they pass a basket for donations but this is not required    Alcoholics Anonymous Online Intergroup (national)  [x] www.aa-intergroup.org  [x] Good resource for large, nation-wide meetings  [x] Can also attend smaller, local meetings in other cities  [x] Countless meetings all day and all night  [x] AA costs nothing to attend; they pass a basket for donations but this is not required    [employer] - 24/7 zoom meetings for women and coed - sign on anytime, anywhere!  [email or web address]    Online Intergroup of AA - [address] [place] [address] - 24/7 zoom meetings  https://aa-intergroup.org/meetings/    LOOKING FOR AN ALTERNATIVE TO 12 STEP PROGRAMS - check out:  [facility]: https://www.smartrecovery.org/about-us  [facility]: [email or web address]      DETOX UNITS (USUALLY 5-7 DAYS):     [facility]: [address], [place]  [phone], call first to ensure bed availability    [facility]: [address] [place]  [phone], Option 1, call first to ensure bed availability    [facility]: [address], [place]  [phone] (intake by appointment only)    [facility]: [address], [place]  [phone]      INTENSIVE OUTPATIENT PROGRAMS:     [facility] (formerly known  as the [facility])  [x] [phone], Option 2  [x] [address], [place] [place] 4th Floor, [facility] [address]  [x] [email or web address]  [x] [facility] delivers comprehensive and collaborative treatment for alcohol and substance use disorders.  Excellent program for working professionals or anyone else seeking recovery.  [x] Requires insurance approval prior to starting program, call number above for more information.  [x] Intensive Outpatient Rehabilitation Program - M-F 9am-3pm - daily groups with psychologists and social workers, sessions with MDs 3x per week   [x] Ambulatory detox and dual diagnosis available    [facility] Intensive Outpatient Program  [x] [phone]  [x] [facility] (the clinic not on [facility])  [x] Call number above for more info and to check insurance requirements    [facility]  [address] [place] [address] [phone]    [facility]:  [address] [facility], [address] [place], [place] [address]?, [phone]  [address] [facility]?, [place], [place] [address]?, [phone]    RESIDENTIAL REHABS (USUALLY 28 DAYS):     [facility]: [address], [phone]    [facility]: [address] [place] [address] , [facility]  [phone] (intake by appointment only)    [facility] (men only) [address], [facility], [phone]    [facility] (Female only) [address] [facility], [phone]    [facility]: [address], [facility], men's program [phone], women's program [phone]    [facility]: [address], [facility], [phone]    [facility]: [address], [place], [place], [phone]    [facility]: Men only, [phone], [address] [place]    [facility]: [address], [place], [place], [phone]    [facility]: [address] [place] [address],  [phone]  New Location: [address] [place] [address]  [address], [place] [address], [phone]    [facility]:   ?[address]?[place], [place] [address]?[phone]    [place]: [address], [place] [address], [phone]    [place]: [place], [place], [phone]     [facility]: [place], [place], [phone]    [facility]: [place], [place], [phone]    [facility]: [place], [place], [phone]    [place]: [place], [phone]    [facility]: [address] [place], [place] [address], [phone]    COMMUNITY ADDICTION CLINICS:     [facility]: [address] [facility], [facility] B V[address]ctor -[phone] -or- [address] [place] [address]    [facility] [place]: [address] [facility] [place]  [address]     [facility]: Clinics [phone]; Crisis [phone]    [facility]: [address] [place] [address]    [facility]: 719 [place] [address]    [facility]: [address] [place] [address],    [facility]: 2nd Floor 5630 [place] [address]    [facility]: [address], [place] [address]    [facility], [address], [place] [address]    [facility]: [address] [place] [address] [phone]  (serves youth 16-23 years old)    [facility]: [facility] [phone]    [facility]: [address] [facility], CHRISTOPHER [phone]    [facility]: [address] [facility] [facility] [phone]    [facility]: [address] [facility] [place], [address], [phone]     [facility]: [address] [facility] Milana Nath, [phone], [phone]    RESOURCES IN OTHER [facility]:     [facility]: [facility]. [address]  "[address] [place], [phone], [phone]    [facility]: [address] [facility], [address], [phone]    [facility], [address] [place] [address], [phone]    [facility]: [address] [facility], [phone]    [facility], [address] [facility], [phone] ([facility])    [facility], [address] [facility], [phone] ([facility])    [facility], [address], [address], [place], [phone] ([facility], and [facility])    [facility], [address] [facility], [phone] ([facility])    [facility] Hotline [phone], [phone]    [facility], [address] [facility], [facility], 232 [facility] [address], [facility] Behavioral [facility], [address] [facility] Behavioral [facility], 500 [facility]. [address]., [facility] Behavioral [facility], [address], [facility] Human [facility], [address], [place] [phone]    [facility], 302 [facility] [phone]    [facility] for Addiction Recovery, [address] [facility], [phone]    [facility] for Addiction Recovery, [address] [facility], [phone]      Romanian SPEAKING (en español):     Información de la reunión de Alcohólicos Anónimos  Eric [facility], 10:00 am  Habla español  Esta reunión está abierta y cualquiera puede asistir.    Taiwanese speaking Alcoholics anonymous meetings:  El "Eric [place] AA Skype" es un eric on line de Alcohólicos Anónimos en español. El eric es josed aniel, gratuito y virtual a través de Skype Audio. El eric funciona mediante marcy llamada " grupal de voz, por lo que no se utiliza la videollamada, ni se pueden toñito las imágenes o rostros de los participantes. Hace jorje años y medio abrimos el primer Eric de AA por German en [place], jorge actualmente asisten personas desde [place], [place], [place], [place], [place], [place], [place], [place], [place], [place], [place] y [place], entre otros.    El eric es muy útil para los alcohólicos que residen en lugares donde no se celebran reuniones de AA, o residen en lugares donde las reuniones de AA son un número limitado de días a la semana, o para aquellos compañeros que se hayan de viaje o que, por cualquier motivo, se hayan convalecientes y no pueden desplazarse. Todos los días nos reuniones a las 21:00 (hora española)    Podéis obtener más información sobre el eric y vanessa sesiones en la página web [email or web address]      MENTAL HEALTH:     [facility] Outpatient [facility]  [phone]    [facility] - General Psychiatry Intensive Outpatient Program  [facility] (formerly known as the BMU)  [phone], option 3    [facility] - Dual Diagnosis Intensive Outpatient Program  [facility] (formerly known as the [facility]BU)  [phone], option 2      [facility] MENTAL HEALTH CENTERS:     [facility]  ([facility], aka [facility])  Serves [facility], and [facility] residents.  Serves uninsured patients & those with Medicaid.  Main location: [address] [place] [address]  [phone]  Walk-in's available during regular business hours.  24/7 Crisis Line: [phone]    [facility]  (aka [facility], [facility])  Serves [place] residents.  Serves uninsured patients, those with Medicaid and some private plans.  Walk-in's available during regular business hours.  Primary care services  available as well.  [facility]: [address] [facility] [address] [place] [address];  [phone]  [place]: [address] [place] [address];  [phone]  24/7 Crisis Line: [phone]    [facility]  Serves uninsured patients & those with Medicaid, call for more info.  Primary care, pediatrics, HIV treatment, and dentistry services available as well.  Three locations.  [phone]    [facility] [employer] [facility] [place]?Corporate Office  Serves patients with Medicaid, Medicare, and private insurance  [address]  [place],?[place] [address]  [phone]    [facility]  Serves uninsured on a sliding scale, as well as Medicaid, Medicare, and private plans.  Eight locations around the [facility].  [phone]    [facility]  Serves uninsured patients & those with Medicaid, private insurances.  Primary care available as well.  [phone]  [address] [place] [address]    [facility]  Serves veterans who were honorably discharged.  [address] [place] [address]  [phone]  24/7 Veterans Crisis Line: [phone] (Press 1)    If you have private insurance and need to find a specialist, please contact your insurance network to request a list of providers covered by your benefits.      MENTAL HEALTH/ADDICTIVE DISORDERS:     AA ([phone]), NA ([phone])   National Suicide Prevention Lifeline- Call 1-820.424.4477 Available 24 hours everyday  [facility] [phone]; Crisis Line [phone] - Call for options A-F:  Intensive Outpatient Treatment/ Day programs   [facility], please contact   [facility], please contact [phone] or [phone] to speak with an admissions counselor.  [facility] (Methadone Maintenance)   [address] [place] [address], [phone]  [address] [place] [address] [phone]  [facility], [address] [address], [phone]  [facility] [phone]  [facility] please contact [phone]  [facility], [address], 4th [place] [address] Phone: [phone]   Acer  [place] Office: [address] [place] [address], [phone]  [place] Office: [address] [facility], [address], [place] [address], [phone]  [place] Office: [address] [place], [place] [address] [phone]    Outpatient Substance Abuse Treatment   [facility] Group (Methadone Maintenance)   [address] [place] [address], [phone]  [address] [place] [address] [phone]  [facility], [address] [address], [phone]  Acer  [place] Office: [address] [address], [phone]  [place] Office: [address] [facility], [address], [place] [address], [phone]  [place] Office: 26[address] [facility] [place] [address] [phone]  [place] [facility], [address] [place] [address] [phone]   [facility] Recovery, [address] [facility], [address], [phone]  [facility] for Addiction Recover, [address] [place], [place] [phone]    Residential Substance Abuse Treatment   [facility] 1125 [facility], [phone]  [facility], 4[address] [facility], [phone]  [facility] (men only) [address] [place] [address], [place] [address], [phone]  Women at the [facility] (women only) [address] [place] [address] [phone]  [facility], [address] [place] [address] [phone]  [facility] (women only), intakes at 4[address] [facility], [phone]  [facility] (7-day program, $100, [address], [phone], [phone], [phone])  [facility]  (Men only, [phone]), [address] Paige (Vets*/Non-Vets)  [facility] (Men only, $400/month program fee) [address], [phone]  [facility] (Women over age 39 only), [address] [facility], [phone]    Out of Area:    [place], [address], [place], [place] ([phone])  [facility] Recovery Program (men only), [address] [place] [address], [phone]  [facility], [address] [place], [place] ([phone])  [place], [facility] [place], [place] ([phone])  [facility], [address] [facility], [phone]  [facility] (Women only, has to have mental illness, can be homeless or substance abuser), [phone]        DOMESTIC VIOLENCE RESOURCES:     Advocacy  [facility] (NOFJC)  [address] [address]    [facility] ? [phone]  Services provided: emergency shelter, individual advocacy, information and referrals, group support, children's program, medical advocacy, forensic medical exams, primary care, legal assistance, counseling, safety planning, and caregiver support    [facility] HEALING AND EMPOWERMENT [place]  Confidential location  [facility] ? [phone]  Services provided: short term emergency shelter, all services provided are free of charge    [facility] CENTERS FOR COMMUNITY ADVOCACY  Multiple locations in [place], [facility], [place], [facility], [place], and [facility] ([place], [place], and [place])    [place] ? [phone]  Services provided: emergency shelter, individual advocacy, information and referrals, group support, children's program, medical advocacy, legal assistance in obtaining restraining orders, counseling, safety planning, and caregiver support    [facility]   Emergency Shelter   [phone]  Emergency Services ,Legal and Financial Assistance Services ,Housing Services ,Support Services      [facility]   [phone]  Emergency Services ,Counseling Services , Housing Services ,Support Services ,Children's Services     WOMEN WITH A VISION  [address] [place] [address]  WWAV ? [phone]  Services provided: advocacy, health education and supportive services, specializing in free healing services for marginalized groups, including LGBTQ individuals and sex workers    SEXUAL TRAUMA AWARENESS AND RESPONSE (STAR)  [address] [place] [address]    STAR ? [phone]  Services provided: individual advocacy, information and referrals, group support, medical advocacy, legal assistance, counseling, and safety planning for survivors of sexual assault    [facility])  [address] [place] [address]  [facility] Forensic Program ? [phone]  Services provided: free forensic medical exams for sexual assault and domestic violence, which can be performed up to 5 days after an incident. It is not necessary to make a police report to receive a forensic medical exam    Legal  [facility]  [address] [address]  Project SAVE ? [phone]  Services provided: free emergency legal representation for survivors of doemstic violence residing in [facility]. Legal services may include temporary restraining orders, temporary child support, custody, and use of property    [facility] LEGAL SERVICES (LS)  [address] [address]  SLLS ? [phone]  Services provided: free legal representation for survivors of domestic violence residing in [facility]. Legal services may include temporary child support, custody, and divorce      HOTLINES:     [facility] DOMESTIC VIOLENCE HOTLINE  [phone]    Services provided: free and confidential hotline for victims and survivors of domestic violence. All calls will be routed to a domestic violence service provided in the  victim or survivor's area    NATIONAL HUMAN TRAFFICKING HOTLINE  ([phone]    Services provided: national anti-trafficking hotline serving victims and survivors of human trafficking. Provides information about local resources, and access to safe space to report tips, seek services, and ask for help    VIA LINK  211 or [phone]    Service provided: counselors can provide crisis counseling. Counselors can also provide information and referrals to programs which can help with needs such as food, shelter, medical care, financial assistance, mental health services, substance abuse treatment, senior services, childcare, and more      HOMELESS SHELTERS:      Homeless shelters  [facility]  Emergency shelter for individuals and families  [address] [facility]  [phone]  [facility]  Emergency shelter for men only  Meals daily 6am, 2pm, & 6pm  Clothing, case management M-F by appointment (ID/job/housing/legal assistance), mail  [address] [facility]  [phone]  [facility]  Emergency shelter for men  [address] [facility]  [phone]  Emergency shelter for women  [address] [facility]  [phone]  Breakfast & lunch daily, dinner M-F  Case management, job counseling services   [facility]  Emergency shelter for teens and young adults up to 22yo  [address] [facility]  [phone]  [facility]  Emergency shelter for women over 19yo and their kids  [address] [place] [address]  [phone]  [facility]  Day program, meals M-F 1PM (arrive early)  Showers, laundry, hygiene kits, showers, phones, , notary services, case management, ID assistance  [address] [facility]  [phone] M-F 8am-2:30pm  [facility]  Day program  MTWF 7:30am-3:30pm,  8:30am-3:30pm  Crisis intervention, employment assistance, food/clothing, hygiene kits, bus tokens, mail  [address] [facility] [address]  [phone]  [facility]  Mobile outreach for homeless persons in  [facility]  [phone]  Healthcare for the Homeless  Primary healthcare, case management, dental services, TB placement  Call ahead  [address]  2nd Floor  [phone]  [facility] at the [facility]  Connects homeless people with their loved ones in other cities by providing transportation costs   [phone]      [place] RESOURCES:     [place] [facility] Crisis Response Team:    Region 12 ([place], [place], [place], and [facility]) ([facility])  [phone]      Outpatient Mental Health & Addiction Clinic Resources for both [facility]:    [facility] Mental Healthcare Resources  Website: [email or web address]  Main Number: [phone]    [facility] ([facility])  [address] ([address]) [place] [address]  [phone]    [facility] ([facility])  [address] ([address] [facility]) [place] [address]  [phone]    [facility]  [address] ([address]) [place] [address]  [phone]    [facility]  [address] (200 [facility]) [place] [address]  [phone]      Addiction Treatment Resources for both [facility]:    [facility] (Detox, Residential, PHP, IOP, and Aftercare Programs)  [address] [place] [address]  [phone]    [facility] (Residential, IOP, Transitional Living, and Aftercare Programs)  [address] [facility] [place] [address]  [phone]    [facility] (Inpatient, Residential, Detox, IOP, Outpatient, and Aftercare Programs)  [address] [facility], [place] [address] [phone] or toll free at [phone]      Outpatient Mental Health Psychotherapy Resources for both [facility]:    Nasreen Diggs, [facility]  [address]  [place] [address]  [phone]  Specialties: Depression, Anxiety, and Life Transitions    Dalila Rodgers, PhD  [address]  [place] [address]  [phone]  Specialties: Testing and Evaluation, Education and Learning Disabilities, and ADHD    Ashley Brown, [facility] [address] [place] [address]  [phone]  Specialties: Obsessive-Compulsive (OCD), Depression, and Relationship Issues    Yesenia Matamoros [facility] [address] [facility] [place] [address]  [phone]  Specialties: Trauma & PTSD, Mood Disorders, and Anxiety    Yesenia Meza, PhD, [facility]  [facility] [address] [place] [address]  [phone]  Specialties: Family Conflict, Child, and Relationship Issues    Wanda Gardner [facility] Counseling [place] [address] [phone]  Specialties: Anxiety, Depression, and Anger Management        IN CASE OF SUICIDAL THINKING, call the National Suicide Hotline Number: 988    988 Suicide & Crisis Lifeline: 988 , 2-615-592-TALK (8255)  Provides 24/7, free and confidential support for people in distress, prevention and crisis resources for you or your loved ones, and best practices for professionals.    Call, text or chat.  https://988One97 Communicationsline.org